# Patient Record
Sex: FEMALE | Race: WHITE | NOT HISPANIC OR LATINO | Employment: OTHER | URBAN - METROPOLITAN AREA
[De-identification: names, ages, dates, MRNs, and addresses within clinical notes are randomized per-mention and may not be internally consistent; named-entity substitution may affect disease eponyms.]

---

## 2018-01-01 ENCOUNTER — OFFICE VISIT (OUTPATIENT)
Dept: PAIN MEDICINE | Facility: CLINIC | Age: 73
End: 2018-01-01

## 2018-01-01 ENCOUNTER — DOCUMENTATION (OUTPATIENT)
Dept: PAIN MEDICINE | Facility: CLINIC | Age: 73
End: 2018-01-01

## 2018-01-01 ENCOUNTER — APPOINTMENT (OUTPATIENT)
Dept: GENERAL RADIOLOGY | Facility: HOSPITAL | Age: 73
End: 2018-01-01

## 2018-01-01 ENCOUNTER — APPOINTMENT (OUTPATIENT)
Dept: MRI IMAGING | Facility: HOSPITAL | Age: 73
End: 2018-01-01

## 2018-01-01 ENCOUNTER — OUTSIDE FACILITY SERVICE (OUTPATIENT)
Dept: PAIN MEDICINE | Facility: CLINIC | Age: 73
End: 2018-01-01

## 2018-01-01 ENCOUNTER — TELEPHONE (OUTPATIENT)
Dept: NEUROSURGERY | Facility: CLINIC | Age: 73
End: 2018-01-01

## 2018-01-01 ENCOUNTER — HOSPITAL ENCOUNTER (INPATIENT)
Facility: HOSPITAL | Age: 73
LOS: 3 days | Discharge: NURSING, REHAB OR LONG-TERM CARE | End: 2018-12-21
Attending: EMERGENCY MEDICINE | Admitting: HOSPITALIST

## 2018-01-01 ENCOUNTER — OFFICE VISIT (OUTPATIENT)
Dept: NEUROSURGERY | Facility: CLINIC | Age: 73
End: 2018-01-01

## 2018-01-01 ENCOUNTER — HOSPITAL ENCOUNTER (OUTPATIENT)
Dept: MRI IMAGING | Facility: HOSPITAL | Age: 73
Discharge: HOME | End: 2018-08-23
Admitting: NURSE PRACTITIONER

## 2018-01-01 ENCOUNTER — DOCUMENTATION (OUTPATIENT)
Dept: SOCIAL WORK | Facility: HOSPITAL | Age: 73
End: 2018-01-01

## 2018-01-01 ENCOUNTER — APPOINTMENT (OUTPATIENT)
Dept: MRI IMAGING | Facility: HOSPITAL | Age: 73
End: 2018-01-01
Attending: HOSPITALIST

## 2018-01-01 ENCOUNTER — RESULTS ENCOUNTER (OUTPATIENT)
Dept: PAIN MEDICINE | Facility: CLINIC | Age: 73
End: 2018-01-01

## 2018-01-01 VITALS
SYSTOLIC BLOOD PRESSURE: 134 MMHG | DIASTOLIC BLOOD PRESSURE: 74 MMHG | RESPIRATION RATE: 16 BRPM | HEART RATE: 72 BPM | HEIGHT: 59 IN | OXYGEN SATURATION: 94 % | TEMPERATURE: 97.8 F

## 2018-01-01 VITALS
HEIGHT: 59 IN | BODY MASS INDEX: 36.29 KG/M2 | SYSTOLIC BLOOD PRESSURE: 110 MMHG | WEIGHT: 180 LBS | HEART RATE: 68 BPM | DIASTOLIC BLOOD PRESSURE: 74 MMHG

## 2018-01-01 VITALS
HEIGHT: 60 IN | OXYGEN SATURATION: 92 % | DIASTOLIC BLOOD PRESSURE: 75 MMHG | HEART RATE: 90 BPM | TEMPERATURE: 98.5 F | SYSTOLIC BLOOD PRESSURE: 125 MMHG | BODY MASS INDEX: 38.99 KG/M2 | RESPIRATION RATE: 18 BRPM | WEIGHT: 198.6 LBS

## 2018-01-01 VITALS — BODY MASS INDEX: 38.87 KG/M2 | WEIGHT: 198 LBS | HEIGHT: 60 IN

## 2018-01-01 VITALS
WEIGHT: 185 LBS | TEMPERATURE: 98.2 F | DIASTOLIC BLOOD PRESSURE: 61 MMHG | BODY MASS INDEX: 37.29 KG/M2 | RESPIRATION RATE: 15 BRPM | HEIGHT: 59 IN | OXYGEN SATURATION: 94 % | HEART RATE: 71 BPM | SYSTOLIC BLOOD PRESSURE: 103 MMHG

## 2018-01-01 VITALS — HEIGHT: 59 IN | HEART RATE: 82 BPM | SYSTOLIC BLOOD PRESSURE: 121 MMHG | DIASTOLIC BLOOD PRESSURE: 66 MMHG

## 2018-01-01 DIAGNOSIS — R53.1 GENERALIZED WEAKNESS: ICD-10-CM

## 2018-01-01 DIAGNOSIS — M51.16 HERNIATION OF LUMBAR INTERVERTEBRAL DISC WITH RADICULOPATHY: ICD-10-CM

## 2018-01-01 DIAGNOSIS — I87.8 VENOUS STASIS: ICD-10-CM

## 2018-01-01 DIAGNOSIS — E87.6 HYPOKALEMIA: Primary | ICD-10-CM

## 2018-01-01 DIAGNOSIS — M54.50 LUMBOSACRAL PAIN: ICD-10-CM

## 2018-01-01 DIAGNOSIS — M48.062 SPINAL STENOSIS OF LUMBAR REGION WITH NEUROGENIC CLAUDICATION: Primary | ICD-10-CM

## 2018-01-01 DIAGNOSIS — S30.0XXA CONTUSION OF LOWER BACK, INITIAL ENCOUNTER: ICD-10-CM

## 2018-01-01 DIAGNOSIS — Z74.09 IMPAIRED FUNCTIONAL MOBILITY, BALANCE, GAIT, AND ENDURANCE: ICD-10-CM

## 2018-01-01 DIAGNOSIS — G89.29 OTHER CHRONIC PAIN: Primary | ICD-10-CM

## 2018-01-01 DIAGNOSIS — M54.16 LUMBAR RADICULOPATHY: ICD-10-CM

## 2018-01-01 DIAGNOSIS — Z91.81 AT HIGH RISK FOR INJURY RELATED TO FALL: ICD-10-CM

## 2018-01-01 DIAGNOSIS — M53.3 SACROILIAC JOINT DYSFUNCTION OF RIGHT SIDE: ICD-10-CM

## 2018-01-01 DIAGNOSIS — R79.1 ELEVATED INR: ICD-10-CM

## 2018-01-01 DIAGNOSIS — G89.29 OTHER CHRONIC PAIN: ICD-10-CM

## 2018-01-01 DIAGNOSIS — S80.12XA LEG HEMATOMA, LEFT, INITIAL ENCOUNTER: ICD-10-CM
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
## 2018-01-01 DIAGNOSIS — S70.01XA CONTUSION OF RIGHT HIP, INITIAL ENCOUNTER: ICD-10-CM

## 2018-01-01 DIAGNOSIS — M54.50 LUMBOSACRAL PAIN: Primary | ICD-10-CM

## 2018-01-01 DIAGNOSIS — Z79.01 ANTICOAGULATED: ICD-10-CM

## 2018-01-01 LAB
ALBUMIN SERPL-MCNC: 3 G/DL (ref 3.5–5.2)
ALBUMIN SERPL-MCNC: 3.5 G/DL (ref 3.5–5.2)
ALBUMIN/GLOB SERPL: 1.2 G/DL
ALBUMIN/GLOB SERPL: 1.2 G/DL
ALP SERPL-CCNC: 104 U/L (ref 39–117)
ALP SERPL-CCNC: 87 U/L (ref 39–117)
ALT SERPL W P-5'-P-CCNC: 19 U/L (ref 1–33)
ALT SERPL W P-5'-P-CCNC: 21 U/L (ref 1–33)
ANION GAP SERPL CALCULATED.3IONS-SCNC: 10.1 MMOL/L
ANION GAP SERPL CALCULATED.3IONS-SCNC: 11.4 MMOL/L
ANION GAP SERPL CALCULATED.3IONS-SCNC: 14.7 MMOL/L
ANION GAP SERPL CALCULATED.3IONS-SCNC: 18.2 MMOL/L
ANION GAP SERPL CALCULATED.3IONS-SCNC: 9.4 MMOL/L
AST SERPL-CCNC: 16 U/L (ref 1–32)
AST SERPL-CCNC: 21 U/L (ref 1–32)
BASOPHILS # BLD AUTO: 0.01 10*3/MM3 (ref 0–0.2)
BASOPHILS # BLD AUTO: 0.03 10*3/MM3 (ref 0–0.2)
BASOPHILS # BLD AUTO: 0.03 10*3/MM3 (ref 0–0.2)
BASOPHILS # BLD AUTO: 0.04 10*3/MM3 (ref 0–0.2)
BASOPHILS NFR BLD AUTO: 0.1 % (ref 0–1.5)
BASOPHILS NFR BLD AUTO: 0.2 % (ref 0–1.5)
BASOPHILS NFR BLD AUTO: 0.3 % (ref 0–1.5)
BASOPHILS NFR BLD AUTO: 0.4 % (ref 0–1.5)
BILIRUB SERPL-MCNC: 0.2 MG/DL (ref 0.1–1.2)
BILIRUB SERPL-MCNC: 0.2 MG/DL (ref 0.1–1.2)
BILIRUB UR QL STRIP: NEGATIVE
BUN BLD-MCNC: 22 MG/DL (ref 8–23)
BUN BLD-MCNC: 22 MG/DL (ref 8–23)
BUN BLD-MCNC: 26 MG/DL (ref 8–23)
BUN BLD-MCNC: 35 MG/DL (ref 8–23)
BUN BLD-MCNC: 41 MG/DL (ref 8–23)
BUN/CREAT SERPL: 23.7 (ref 7–25)
BUN/CREAT SERPL: 24.1 (ref 7–25)
BUN/CREAT SERPL: 26.2 (ref 7–25)
BUN/CREAT SERPL: 27.9 (ref 7–25)
BUN/CREAT SERPL: 30.2 (ref 7–25)
CALCIUM SPEC-SCNC: 8.8 MG/DL (ref 8.6–10.5)
CALCIUM SPEC-SCNC: 9 MG/DL (ref 8.6–10.5)
CALCIUM SPEC-SCNC: 9.2 MG/DL (ref 8.6–10.5)
CALCIUM SPEC-SCNC: 9.4 MG/DL (ref 8.6–10.5)
CALCIUM SPEC-SCNC: 9.4 MG/DL (ref 8.6–10.5)
CHLORIDE SERPL-SCNC: 102 MMOL/L (ref 98–107)
CHLORIDE SERPL-SCNC: 103 MMOL/L (ref 98–107)
CHLORIDE SERPL-SCNC: 104 MMOL/L (ref 98–107)
CHLORIDE SERPL-SCNC: 90 MMOL/L (ref 98–107)
CHLORIDE SERPL-SCNC: 94 MMOL/L (ref 98–107)
CK SERPL-CCNC: 44 U/L (ref 20–180)
CLARITY UR: CLEAR
CO2 SERPL-SCNC: 22.6 MMOL/L (ref 22–29)
CO2 SERPL-SCNC: 23.6 MMOL/L (ref 22–29)
CO2 SERPL-SCNC: 23.9 MMOL/L (ref 22–29)
CO2 SERPL-SCNC: 26.8 MMOL/L (ref 22–29)
CO2 SERPL-SCNC: 28.3 MMOL/L (ref 22–29)
COLOR UR: YELLOW
CREAT BLD-MCNC: 0.84 MG/DL (ref 0.57–1)
CREAT BLD-MCNC: 0.93 MG/DL (ref 0.57–1)
CREAT BLD-MCNC: 1.08 MG/DL (ref 0.57–1)
CREAT BLD-MCNC: 1.16 MG/DL (ref 0.57–1)
CREAT BLD-MCNC: 1.47 MG/DL (ref 0.57–1)
DEPRECATED RDW RBC AUTO: 53.6 FL (ref 37–54)
DEPRECATED RDW RBC AUTO: 54.3 FL (ref 37–54)
DEPRECATED RDW RBC AUTO: 56.2 FL (ref 37–54)
DEPRECATED RDW RBC AUTO: 56.2 FL (ref 37–54)
EOSINOPHIL # BLD AUTO: 0 10*3/MM3 (ref 0–0.7)
EOSINOPHIL # BLD AUTO: 0.06 10*3/MM3 (ref 0–0.7)
EOSINOPHIL # BLD AUTO: 0.08 10*3/MM3 (ref 0–0.7)
EOSINOPHIL # BLD AUTO: 0.14 10*3/MM3 (ref 0–0.7)
EOSINOPHIL NFR BLD AUTO: 0 % (ref 0.3–6.2)
EOSINOPHIL NFR BLD AUTO: 0.5 % (ref 0.3–6.2)
EOSINOPHIL NFR BLD AUTO: 0.7 % (ref 0.3–6.2)
EOSINOPHIL NFR BLD AUTO: 1.3 % (ref 0.3–6.2)
ERYTHROCYTE [DISTWIDTH] IN BLOOD BY AUTOMATED COUNT: 16 % (ref 11.7–13)
ERYTHROCYTE [DISTWIDTH] IN BLOOD BY AUTOMATED COUNT: 16.1 % (ref 11.7–13)
ERYTHROCYTE [DISTWIDTH] IN BLOOD BY AUTOMATED COUNT: 16.4 % (ref 11.7–13)
ERYTHROCYTE [DISTWIDTH] IN BLOOD BY AUTOMATED COUNT: 16.8 % (ref 11.7–13)
FOLATE SERPL-MCNC: 5.56 NG/ML (ref 4.78–24.2)
GFR SERPL CREATININE-BSD FRML MDRD: 35 ML/MIN/1.73
GFR SERPL CREATININE-BSD FRML MDRD: 46 ML/MIN/1.73
GFR SERPL CREATININE-BSD FRML MDRD: 50 ML/MIN/1.73
GFR SERPL CREATININE-BSD FRML MDRD: 59 ML/MIN/1.73
GFR SERPL CREATININE-BSD FRML MDRD: 67 ML/MIN/1.73
GLOBULIN UR ELPH-MCNC: 2.5 GM/DL
GLOBULIN UR ELPH-MCNC: 2.9 GM/DL
GLUCOSE BLD-MCNC: 106 MG/DL (ref 65–99)
GLUCOSE BLD-MCNC: 120 MG/DL (ref 65–99)
GLUCOSE BLD-MCNC: 130 MG/DL (ref 65–99)
GLUCOSE BLD-MCNC: 144 MG/DL (ref 65–99)
GLUCOSE BLD-MCNC: 199 MG/DL (ref 65–99)
GLUCOSE UR STRIP-MCNC: NEGATIVE MG/DL
HCT VFR BLD AUTO: 23.9 % (ref 35.6–45.5)
HCT VFR BLD AUTO: 28 % (ref 35.6–45.5)
HCT VFR BLD AUTO: 28.7 % (ref 35.6–45.5)
HCT VFR BLD AUTO: 29.8 % (ref 35.6–45.5)
HCT VFR BLD AUTO: 30 % (ref 35.6–45.5)
HGB BLD-MCNC: 7.6 G/DL (ref 11.9–15.5)
HGB BLD-MCNC: 8.5 G/DL (ref 11.9–15.5)
HGB BLD-MCNC: 8.8 G/DL (ref 11.9–15.5)
HGB BLD-MCNC: 9.3 G/DL (ref 11.9–15.5)
HGB BLD-MCNC: 9.4 G/DL (ref 11.9–15.5)
HGB UR QL STRIP.AUTO: NEGATIVE
IMM GRANULOCYTES # BLD AUTO: 0.3 10*3/MM3 (ref 0–0.03)
IMM GRANULOCYTES # BLD: 0.17 10*3/MM3 (ref 0–0.03)
IMM GRANULOCYTES # BLD: 0.2 10*3/MM3 (ref 0–0.03)
IMM GRANULOCYTES # BLD: 0.35 10*3/MM3 (ref 0–0.03)
IMM GRANULOCYTES NFR BLD AUTO: 2.6 % (ref 0–0.5)
IMM GRANULOCYTES NFR BLD: 1.6 % (ref 0–0.5)
IMM GRANULOCYTES NFR BLD: 1.6 % (ref 0–0.5)
IMM GRANULOCYTES NFR BLD: 3.4 % (ref 0–0.5)
INR PPP: 2.2 (ref 0.9–1.1)
INR PPP: 3.22 (ref 0.9–1.1)
INR PPP: 3.82 (ref 0.9–1.1)
INR PPP: 5.3 (ref 0.9–1.1)
INR PPP: 5.6 (ref 0.9–1.1)
IRON 24H UR-MRATE: 53 MCG/DL (ref 37–145)
IRON SATN MFR SERPL: 16 % (ref 20–50)
KETONES UR QL STRIP: NEGATIVE
LEUKOCYTE ESTERASE UR QL STRIP.AUTO: NEGATIVE
LYMPHOCYTES # BLD AUTO: 1.99 10*3/MM3 (ref 0.9–4.8)
LYMPHOCYTES # BLD AUTO: 2.56 10*3/MM3 (ref 0.9–4.8)
LYMPHOCYTES # BLD AUTO: 3.14 10*3/MM3 (ref 0.9–4.8)
LYMPHOCYTES # BLD AUTO: 3.29 10*3/MM3 (ref 0.9–4.8)
LYMPHOCYTES NFR BLD AUTO: 19.2 % (ref 19.6–45.3)
LYMPHOCYTES NFR BLD AUTO: 22.2 % (ref 19.6–45.3)
LYMPHOCYTES NFR BLD AUTO: 25.7 % (ref 19.6–45.3)
LYMPHOCYTES NFR BLD AUTO: 30.4 % (ref 19.6–45.3)
MAGNESIUM SERPL-MCNC: 1.8 MG/DL (ref 1.6–2.4)
MCH RBC QN AUTO: 28.2 PG (ref 26.9–32)
MCH RBC QN AUTO: 28.8 PG (ref 26.9–32)
MCH RBC QN AUTO: 28.9 PG (ref 26.9–32)
MCH RBC QN AUTO: 29.2 PG (ref 26.9–32)
MCHC RBC AUTO-ENTMCNC: 30.4 G/DL (ref 32.4–36.3)
MCHC RBC AUTO-ENTMCNC: 30.7 G/DL (ref 32.4–36.3)
MCHC RBC AUTO-ENTMCNC: 31.5 G/DL (ref 32.4–36.3)
MCHC RBC AUTO-ENTMCNC: 31.8 G/DL (ref 32.4–36.3)
MCV RBC AUTO: 91.7 FL (ref 80.5–98.2)
MCV RBC AUTO: 91.9 FL (ref 80.5–98.2)
MCV RBC AUTO: 92 FL (ref 80.5–98.2)
MCV RBC AUTO: 94.9 FL (ref 80.5–98.2)
MONOCYTES # BLD AUTO: 0.66 10*3/MM3 (ref 0.2–1.2)
MONOCYTES # BLD AUTO: 1.04 10*3/MM3 (ref 0.2–1.2)
MONOCYTES # BLD AUTO: 1.27 10*3/MM3 (ref 0.2–1.2)
MONOCYTES # BLD AUTO: 1.31 10*3/MM3 (ref 0.2–1.2)
MONOCYTES NFR BLD AUTO: 10.4 % (ref 5–12)
MONOCYTES NFR BLD AUTO: 12.1 % (ref 5–12)
MONOCYTES NFR BLD AUTO: 6.4 % (ref 5–12)
MONOCYTES NFR BLD AUTO: 9 % (ref 5–12)
NEUTROPHILS # BLD AUTO: 5.89 10*3/MM3 (ref 1.9–8.1)
NEUTROPHILS # BLD AUTO: 7.38 10*3/MM3 (ref 1.9–8.1)
NEUTROPHILS # BLD AUTO: 7.52 10*3/MM3 (ref 1.9–8.1)
NEUTROPHILS # BLD AUTO: 7.84 10*3/MM3 (ref 1.9–8.1)
NEUTROPHILS NFR BLD AUTO: 54.2 % (ref 42.7–76)
NEUTROPHILS NFR BLD AUTO: 61.4 % (ref 42.7–76)
NEUTROPHILS NFR BLD AUTO: 68 % (ref 42.7–76)
NEUTROPHILS NFR BLD AUTO: 70.9 % (ref 42.7–76)
NITRITE UR QL STRIP: NEGATIVE
PH UR STRIP.AUTO: 6.5 [PH] (ref 5–8)
PLAT MORPH BLD: NORMAL
PLATELET # BLD AUTO: 297 10*3/MM3 (ref 140–500)
PLATELET # BLD AUTO: 299 10*3/MM3 (ref 140–500)
PLATELET # BLD AUTO: 308 10*3/MM3 (ref 140–500)
PLATELET # BLD AUTO: 309 10*3/MM3 (ref 140–500)
PMV BLD AUTO: 9.3 FL (ref 6–12)
PMV BLD AUTO: 9.3 FL (ref 6–12)
PMV BLD AUTO: 9.4 FL (ref 6–12)
PMV BLD AUTO: 9.4 FL (ref 6–12)
POTASSIUM BLD-SCNC: 2.5 MMOL/L (ref 3.5–5.2)
POTASSIUM BLD-SCNC: 2.5 MMOL/L (ref 3.5–5.2)
POTASSIUM BLD-SCNC: 3.6 MMOL/L (ref 3.5–5.2)
POTASSIUM BLD-SCNC: 3.8 MMOL/L (ref 3.5–5.2)
POTASSIUM BLD-SCNC: 3.9 MMOL/L (ref 3.5–5.2)
POTASSIUM BLD-SCNC: 4.2 MMOL/L (ref 3.5–5.2)
PROT SERPL-MCNC: 5.5 G/DL (ref 6–8.5)
PROT SERPL-MCNC: 6.4 G/DL (ref 6–8.5)
PROT UR QL STRIP: NEGATIVE
PROTHROMBIN TIME: 24.1 SECONDS (ref 11.7–14.2)
PROTHROMBIN TIME: 32.4 SECONDS (ref 11.7–14.2)
PROTHROMBIN TIME: 37 SECONDS (ref 11.7–14.2)
PROTHROMBIN TIME: 47.9 SECONDS (ref 11.7–14.2)
PROTHROMBIN TIME: 49.9 SECONDS (ref 11.7–14.2)
RBC # BLD AUTO: 2.6 10*6/MM3 (ref 3.9–5.2)
RBC # BLD AUTO: 2.95 10*6/MM3 (ref 3.9–5.2)
RBC # BLD AUTO: 3.12 10*6/MM3 (ref 3.9–5.2)
RBC # BLD AUTO: 3.25 10*6/MM3 (ref 3.9–5.2)
RBC MORPH BLD: NORMAL
SODIUM BLD-SCNC: 135 MMOL/L (ref 136–145)
SODIUM BLD-SCNC: 136 MMOL/L (ref 136–145)
SODIUM BLD-SCNC: 136 MMOL/L (ref 136–145)
SODIUM BLD-SCNC: 137 MMOL/L (ref 136–145)
SODIUM BLD-SCNC: 138 MMOL/L (ref 136–145)
SP GR UR STRIP: 1.01 (ref 1–1.03)
TIBC SERPL-MCNC: 334 MCG/DL
TRANSFERRIN SERPL-MCNC: 224 MG/DL (ref 200–360)
TROPONIN T SERPL-MCNC: <0.01 NG/ML (ref 0–0.03)
TSH SERPL DL<=0.05 MIU/L-ACNC: 0.04 MIU/ML (ref 0.27–4.2)
UROBILINOGEN UR QL STRIP: NORMAL
VIT B12 BLD-MCNC: 374 PG/ML (ref 211–946)
WBC MORPH BLD: NORMAL
WBC NRBC COR # BLD: 10.39 10*3/MM3 (ref 4.5–10.7)
WBC NRBC COR # BLD: 10.84 10*3/MM3 (ref 4.5–10.7)
WBC NRBC COR # BLD: 11.53 10*3/MM3 (ref 4.5–10.7)
WBC NRBC COR # BLD: 12.24 10*3/MM3 (ref 4.5–10.7)

## 2018-01-01 PROCEDURE — 64483 NJX AA&/STRD TFRM EPI L/S 1: CPT | Performed by: ANESTHESIOLOGY

## 2018-01-01 PROCEDURE — 72110 X-RAY EXAM L-2 SPINE 4/>VWS: CPT

## 2018-01-01 PROCEDURE — 99285 EMERGENCY DEPT VISIT HI MDM: CPT

## 2018-01-01 PROCEDURE — 81003 URINALYSIS AUTO W/O SCOPE: CPT | Performed by: PHYSICIAN ASSISTANT

## 2018-01-01 PROCEDURE — 97110 THERAPEUTIC EXERCISES: CPT

## 2018-01-01 PROCEDURE — 97535 SELF CARE MNGMENT TRAINING: CPT

## 2018-01-01 PROCEDURE — 25010000002 METHYLPREDNISOLONE PER 125 MG: Performed by: HOSPITALIST

## 2018-01-01 PROCEDURE — 84132 ASSAY OF SERUM POTASSIUM: CPT | Performed by: HOSPITALIST

## 2018-01-01 PROCEDURE — 85610 PROTHROMBIN TIME: CPT | Performed by: INTERNAL MEDICINE

## 2018-01-01 PROCEDURE — 84466 ASSAY OF TRANSFERRIN: CPT | Performed by: HOSPITALIST

## 2018-01-01 PROCEDURE — 93005 ELECTROCARDIOGRAM TRACING: CPT | Performed by: HOSPITALIST

## 2018-01-01 PROCEDURE — 93010 ELECTROCARDIOGRAM REPORT: CPT | Performed by: INTERNAL MEDICINE

## 2018-01-01 PROCEDURE — 85014 HEMATOCRIT: CPT | Performed by: HOSPITALIST

## 2018-01-01 PROCEDURE — 97166 OT EVAL MOD COMPLEX 45 MIN: CPT

## 2018-01-01 PROCEDURE — 25010000003 POTASSIUM CHLORIDE 10 MEQ/100ML SOLUTION: Performed by: PHYSICIAN ASSISTANT

## 2018-01-01 PROCEDURE — 0 GADOBENATE DIMEGLUMINE 529 MG/ML SOLUTION: Performed by: HOSPITALIST

## 2018-01-01 PROCEDURE — 85025 COMPLETE CBC W/AUTO DIFF WBC: CPT | Performed by: HOSPITALIST

## 2018-01-01 PROCEDURE — 63710000001 PREDNISONE PER 1 MG: Performed by: HOSPITALIST

## 2018-01-01 PROCEDURE — 83735 ASSAY OF MAGNESIUM: CPT | Performed by: HOSPITALIST

## 2018-01-01 PROCEDURE — 97530 THERAPEUTIC ACTIVITIES: CPT

## 2018-01-01 PROCEDURE — 99214 OFFICE O/P EST MOD 30 MIN: CPT | Performed by: NURSE PRACTITIONER

## 2018-01-01 PROCEDURE — 99214 OFFICE O/P EST MOD 30 MIN: CPT | Performed by: NEUROLOGICAL SURGERY

## 2018-01-01 PROCEDURE — 70551 MRI BRAIN STEM W/O DYE: CPT

## 2018-01-01 PROCEDURE — 85610 PROTHROMBIN TIME: CPT | Performed by: PHYSICIAN ASSISTANT

## 2018-01-01 PROCEDURE — 84443 ASSAY THYROID STIM HORMONE: CPT | Performed by: HOSPITALIST

## 2018-01-01 PROCEDURE — 25010000002 HYDROMORPHONE PER 4 MG: Performed by: HOSPITALIST

## 2018-01-01 PROCEDURE — 82746 ASSAY OF FOLIC ACID SERUM: CPT | Performed by: HOSPITALIST

## 2018-01-01 PROCEDURE — 80048 BASIC METABOLIC PNL TOTAL CA: CPT | Performed by: HOSPITALIST

## 2018-01-01 PROCEDURE — 93005 ELECTROCARDIOGRAM TRACING: CPT | Performed by: PHYSICIAN ASSISTANT

## 2018-01-01 PROCEDURE — 85018 HEMOGLOBIN: CPT | Performed by: HOSPITALIST

## 2018-01-01 PROCEDURE — 73552 X-RAY EXAM OF FEMUR 2/>: CPT

## 2018-01-01 PROCEDURE — 85025 COMPLETE CBC W/AUTO DIFF WBC: CPT | Performed by: PHYSICIAN ASSISTANT

## 2018-01-01 PROCEDURE — 90791 PSYCH DIAGNOSTIC EVALUATION: CPT | Performed by: MARRIAGE & FAMILY THERAPIST

## 2018-01-01 PROCEDURE — 80053 COMPREHEN METABOLIC PANEL: CPT | Performed by: PHYSICIAN ASSISTANT

## 2018-01-01 PROCEDURE — A9577 INJ MULTIHANCE: HCPCS | Performed by: HOSPITALIST

## 2018-01-01 PROCEDURE — 27096 INJECT SACROILIAC JOINT: CPT | Performed by: ANESTHESIOLOGY

## 2018-01-01 PROCEDURE — 97162 PT EVAL MOD COMPLEX 30 MIN: CPT

## 2018-01-01 PROCEDURE — 85007 BL SMEAR W/DIFF WBC COUNT: CPT | Performed by: HOSPITALIST

## 2018-01-01 PROCEDURE — 85025 COMPLETE CBC W/AUTO DIFF WBC: CPT | Performed by: INTERNAL MEDICINE

## 2018-01-01 PROCEDURE — 80053 COMPREHEN METABOLIC PANEL: CPT | Performed by: HOSPITALIST

## 2018-01-01 PROCEDURE — 84484 ASSAY OF TROPONIN QUANT: CPT | Performed by: HOSPITALIST

## 2018-01-01 PROCEDURE — 82607 VITAMIN B-12: CPT | Performed by: HOSPITALIST

## 2018-01-01 PROCEDURE — 99215 OFFICE O/P EST HI 40 MIN: CPT | Performed by: NURSE PRACTITIONER

## 2018-01-01 PROCEDURE — 73502 X-RAY EXAM HIP UNI 2-3 VIEWS: CPT

## 2018-01-01 PROCEDURE — 83540 ASSAY OF IRON: CPT | Performed by: HOSPITALIST

## 2018-01-01 PROCEDURE — 99203 OFFICE O/P NEW LOW 30 MIN: CPT | Performed by: NURSE PRACTITIONER

## 2018-01-01 PROCEDURE — 25810000003 SODIUM CHLORIDE 0.9 % WITH KCL 20 MEQ 20-0.9 MEQ/L-% SOLUTION: Performed by: HOSPITALIST

## 2018-01-01 PROCEDURE — 82550 ASSAY OF CK (CPK): CPT | Performed by: HOSPITALIST

## 2018-01-01 PROCEDURE — 72158 MRI LUMBAR SPINE W/O & W/DYE: CPT

## 2018-01-01 PROCEDURE — 72148 MRI LUMBAR SPINE W/O DYE: CPT

## 2018-01-01 RX ORDER — DULOXETIN HYDROCHLORIDE 30 MG/1
30 CAPSULE, DELAYED RELEASE ORAL 2 TIMES DAILY
Start: 2018-01-01 | End: 2019-01-01 | Stop reason: SDUPTHER

## 2018-01-01 RX ORDER — MAGNESIUM SULFATE HEPTAHYDRATE 40 MG/ML
2 INJECTION, SOLUTION INTRAVENOUS AS NEEDED
Status: DISCONTINUED | OUTPATIENT
Start: 2018-01-01 | End: 2018-01-01 | Stop reason: HOSPADM

## 2018-01-01 RX ORDER — POTASSIUM CHLORIDE 7.45 MG/ML
10 INJECTION INTRAVENOUS ONCE
Status: COMPLETED | OUTPATIENT
Start: 2018-01-01 | End: 2018-01-01

## 2018-01-01 RX ORDER — SODIUM CHLORIDE 9 MG/ML
125 INJECTION, SOLUTION INTRAVENOUS CONTINUOUS
Status: DISCONTINUED | OUTPATIENT
Start: 2018-01-01 | End: 2018-01-01

## 2018-01-01 RX ORDER — ONDANSETRON 4 MG/1
4 TABLET, FILM COATED ORAL EVERY 6 HOURS PRN
Status: DISCONTINUED | OUTPATIENT
Start: 2018-01-01 | End: 2018-01-01 | Stop reason: HOSPADM

## 2018-01-01 RX ORDER — ONDANSETRON 2 MG/ML
4 INJECTION INTRAMUSCULAR; INTRAVENOUS EVERY 6 HOURS PRN
Status: DISCONTINUED | OUTPATIENT
Start: 2018-01-01 | End: 2018-01-01 | Stop reason: SDUPTHER

## 2018-01-01 RX ORDER — HYDROCODONE BITARTRATE AND ACETAMINOPHEN 7.5; 325 MG/1; MG/1
1 TABLET ORAL EVERY 4 HOURS PRN
Status: DISCONTINUED | OUTPATIENT
Start: 2018-01-01 | End: 2018-01-01 | Stop reason: HOSPADM

## 2018-01-01 RX ORDER — SODIUM CHLORIDE 0.9 % (FLUSH) 0.9 %
3 SYRINGE (ML) INJECTION EVERY 12 HOURS SCHEDULED
Status: DISCONTINUED | OUTPATIENT
Start: 2018-01-01 | End: 2018-01-01 | Stop reason: HOSPADM

## 2018-01-01 RX ORDER — FAMOTIDINE 20 MG/1
20 TABLET, FILM COATED ORAL ONCE
Status: COMPLETED | OUTPATIENT
Start: 2018-01-01 | End: 2018-01-01

## 2018-01-01 RX ORDER — WARFARIN SODIUM 2 MG/1
TABLET ORAL
Start: 2018-01-01 | End: 2019-01-01

## 2018-01-01 RX ORDER — PREDNISONE 20 MG/1
20 TABLET ORAL DAILY
Status: DISCONTINUED | OUTPATIENT
Start: 2018-01-01 | End: 2018-01-01 | Stop reason: HOSPADM

## 2018-01-01 RX ORDER — ATORVASTATIN CALCIUM 20 MG/1
20 TABLET, FILM COATED ORAL NIGHTLY
Status: DISCONTINUED | OUTPATIENT
Start: 2018-01-01 | End: 2018-01-01 | Stop reason: HOSPADM

## 2018-01-01 RX ORDER — DULOXETIN HYDROCHLORIDE 30 MG/1
30 CAPSULE, DELAYED RELEASE ORAL 2 TIMES DAILY
Status: DISCONTINUED | OUTPATIENT
Start: 2018-01-01 | End: 2018-01-01 | Stop reason: HOSPADM

## 2018-01-01 RX ORDER — ACETAMINOPHEN 325 MG/1
650 TABLET ORAL EVERY 4 HOURS PRN
Status: DISCONTINUED | OUTPATIENT
Start: 2018-01-01 | End: 2018-01-01 | Stop reason: HOSPADM

## 2018-01-01 RX ORDER — POTASSIUM CHLORIDE 750 MG/1
40 CAPSULE, EXTENDED RELEASE ORAL ONCE
Status: COMPLETED | OUTPATIENT
Start: 2018-01-01 | End: 2018-01-01

## 2018-01-01 RX ORDER — POTASSIUM CHLORIDE 1.5 G/1.77G
40 POWDER, FOR SOLUTION ORAL AS NEEDED
Status: DISCONTINUED | OUTPATIENT
Start: 2018-01-01 | End: 2018-01-01 | Stop reason: HOSPADM

## 2018-01-01 RX ORDER — MAGNESIUM SULFATE HEPTAHYDRATE 40 MG/ML
4 INJECTION, SOLUTION INTRAVENOUS AS NEEDED
Status: DISCONTINUED | OUTPATIENT
Start: 2018-01-01 | End: 2018-01-01 | Stop reason: HOSPADM

## 2018-01-01 RX ORDER — NALOXONE HCL 0.4 MG/ML
0.4 VIAL (ML) INJECTION
Status: DISCONTINUED | OUTPATIENT
Start: 2018-01-01 | End: 2018-01-01

## 2018-01-01 RX ORDER — SODIUM CHLORIDE 0.9 % (FLUSH) 0.9 %
3-10 SYRINGE (ML) INJECTION AS NEEDED
Status: DISCONTINUED | OUTPATIENT
Start: 2018-01-01 | End: 2018-01-01 | Stop reason: HOSPADM

## 2018-01-01 RX ORDER — ALPRAZOLAM 0.5 MG/1
0.5 TABLET ORAL 2 TIMES DAILY PRN
COMMUNITY
End: 2019-01-01 | Stop reason: SDUPTHER

## 2018-01-01 RX ORDER — HYDROMORPHONE HYDROCHLORIDE 1 MG/ML
0.5 INJECTION, SOLUTION INTRAMUSCULAR; INTRAVENOUS; SUBCUTANEOUS
Status: DISCONTINUED | OUTPATIENT
Start: 2018-01-01 | End: 2018-01-01

## 2018-01-01 RX ORDER — PREDNISONE 20 MG/1
20 TABLET ORAL DAILY
Start: 2018-01-01 | End: 2019-01-01 | Stop reason: DRUGHIGH

## 2018-01-01 RX ORDER — ISOSORBIDE MONONITRATE 30 MG/1
30 TABLET, EXTENDED RELEASE ORAL DAILY
Status: DISCONTINUED | OUTPATIENT
Start: 2018-01-01 | End: 2018-01-01 | Stop reason: HOSPADM

## 2018-01-01 RX ORDER — ISOSORBIDE MONONITRATE 30 MG/1
30 TABLET, EXTENDED RELEASE ORAL DAILY
COMMUNITY
End: 2019-01-01 | Stop reason: SDUPTHER

## 2018-01-01 RX ORDER — HYDROCODONE BITARTRATE AND ACETAMINOPHEN 7.5; 325 MG/1; MG/1
1 TABLET ORAL EVERY 4 HOURS PRN
Qty: 21 TABLET | Refills: 0 | Status: SHIPPED | OUTPATIENT
Start: 2018-01-01 | End: 2018-01-01

## 2018-01-01 RX ORDER — SENNA AND DOCUSATE SODIUM 50; 8.6 MG/1; MG/1
2 TABLET, FILM COATED ORAL 2 TIMES DAILY PRN
Status: DISCONTINUED | OUTPATIENT
Start: 2018-01-01 | End: 2018-01-01 | Stop reason: HOSPADM

## 2018-01-01 RX ORDER — OXYCODONE HYDROCHLORIDE AND ACETAMINOPHEN 5; 325 MG/1; MG/1
1 TABLET ORAL ONCE
Status: COMPLETED | OUTPATIENT
Start: 2018-01-01 | End: 2018-01-01

## 2018-01-01 RX ORDER — ACETAMINOPHEN 325 MG/1
650 TABLET ORAL 4 TIMES DAILY
Start: 2018-01-01 | End: 2019-01-01

## 2018-01-01 RX ORDER — ONDANSETRON 4 MG/1
4 TABLET, ORALLY DISINTEGRATING ORAL ONCE
Status: COMPLETED | OUTPATIENT
Start: 2018-01-01 | End: 2018-01-01

## 2018-01-01 RX ORDER — ALPRAZOLAM 0.5 MG/1
0.5 TABLET ORAL 2 TIMES DAILY PRN
Status: DISCONTINUED | OUTPATIENT
Start: 2018-01-01 | End: 2018-01-01 | Stop reason: HOSPADM

## 2018-01-01 RX ORDER — SODIUM CHLORIDE 0.9 % (FLUSH) 0.9 %
10 SYRINGE (ML) INJECTION AS NEEDED
Status: DISCONTINUED | OUTPATIENT
Start: 2018-01-01 | End: 2018-01-01 | Stop reason: HOSPADM

## 2018-01-01 RX ORDER — SODIUM CHLORIDE AND POTASSIUM CHLORIDE 150; 900 MG/100ML; MG/100ML
100 INJECTION, SOLUTION INTRAVENOUS CONTINUOUS
Status: DISCONTINUED | OUTPATIENT
Start: 2018-01-01 | End: 2018-01-01

## 2018-01-01 RX ORDER — CARVEDILOL 12.5 MG/1
12.5 TABLET ORAL EVERY 12 HOURS SCHEDULED
Start: 2018-01-01 | End: 2019-01-01 | Stop reason: SDUPTHER

## 2018-01-01 RX ORDER — FUROSEMIDE 40 MG/1
40 TABLET ORAL DAILY
Start: 2018-01-01 | End: 2019-01-01 | Stop reason: SDUPTHER

## 2018-01-01 RX ORDER — ONDANSETRON 4 MG/1
4 TABLET, ORALLY DISINTEGRATING ORAL EVERY 6 HOURS PRN
Status: DISCONTINUED | OUTPATIENT
Start: 2018-01-01 | End: 2018-01-01 | Stop reason: SDUPTHER

## 2018-01-01 RX ORDER — PREDNISONE 10 MG/1
10 TABLET ORAL DAILY
COMMUNITY
End: 2018-01-01 | Stop reason: HOSPADM

## 2018-01-01 RX ORDER — MULTIPLE VITAMINS W/ MINERALS TAB 9MG-400MCG
1 TAB ORAL DAILY
Status: DISCONTINUED | OUTPATIENT
Start: 2018-01-01 | End: 2018-01-01 | Stop reason: HOSPADM

## 2018-01-01 RX ORDER — FAMOTIDINE 20 MG/1
20 TABLET, FILM COATED ORAL 2 TIMES DAILY
Status: DISCONTINUED | OUTPATIENT
Start: 2018-01-01 | End: 2018-01-01

## 2018-01-01 RX ORDER — AMLODIPINE BESYLATE 10 MG/1
10 TABLET ORAL DAILY
Status: DISCONTINUED | OUTPATIENT
Start: 2018-01-01 | End: 2018-01-01 | Stop reason: HOSPADM

## 2018-01-01 RX ORDER — ASPIRIN 81 MG/1
81 TABLET, CHEWABLE ORAL DAILY
Status: DISCONTINUED | OUTPATIENT
Start: 2018-01-01 | End: 2018-01-01 | Stop reason: HOSPADM

## 2018-01-01 RX ORDER — METHYLPREDNISOLONE SODIUM SUCCINATE 125 MG/2ML
60 INJECTION, POWDER, LYOPHILIZED, FOR SOLUTION INTRAMUSCULAR; INTRAVENOUS ONCE
Status: COMPLETED | OUTPATIENT
Start: 2018-01-01 | End: 2018-01-01

## 2018-01-01 RX ORDER — ALUMINA, MAGNESIA, AND SIMETHICONE 2400; 2400; 240 MG/30ML; MG/30ML; MG/30ML
15 SUSPENSION ORAL EVERY 6 HOURS PRN
Status: DISCONTINUED | OUTPATIENT
Start: 2018-01-01 | End: 2018-01-01 | Stop reason: HOSPADM

## 2018-01-01 RX ORDER — HYDROCODONE BITARTRATE AND ACETAMINOPHEN 5; 325 MG/1; MG/1
1 TABLET ORAL EVERY 4 HOURS PRN
Status: DISCONTINUED | OUTPATIENT
Start: 2018-01-01 | End: 2018-01-01

## 2018-01-01 RX ORDER — WARFARIN SODIUM 2 MG/1
2 TABLET ORAL
Status: DISCONTINUED | OUTPATIENT
Start: 2018-01-01 | End: 2018-01-01 | Stop reason: HOSPADM

## 2018-01-01 RX ORDER — DOXYCYCLINE HYCLATE 100 MG/1
100 CAPSULE ORAL 2 TIMES DAILY
COMMUNITY
End: 2018-01-01 | Stop reason: HOSPADM

## 2018-01-01 RX ORDER — POTASSIUM CHLORIDE 20 MEQ/1
20 TABLET, EXTENDED RELEASE ORAL DAILY
Start: 2018-01-01 | End: 2019-01-01 | Stop reason: SDUPTHER

## 2018-01-01 RX ORDER — LEVOTHYROXINE SODIUM 0.12 MG/1
125 TABLET ORAL DAILY
Status: DISCONTINUED | OUTPATIENT
Start: 2018-01-01 | End: 2018-01-01 | Stop reason: HOSPADM

## 2018-01-01 RX ORDER — PANTOPRAZOLE SODIUM 40 MG/1
40 TABLET, DELAYED RELEASE ORAL EVERY MORNING
Status: DISCONTINUED | OUTPATIENT
Start: 2018-01-01 | End: 2018-01-01 | Stop reason: HOSPADM

## 2018-01-01 RX ORDER — ONDANSETRON 4 MG/1
4 TABLET, ORALLY DISINTEGRATING ORAL EVERY 6 HOURS PRN
Status: DISCONTINUED | OUTPATIENT
Start: 2018-01-01 | End: 2018-01-01 | Stop reason: HOSPADM

## 2018-01-01 RX ORDER — ONDANSETRON 4 MG/1
4 TABLET, FILM COATED ORAL EVERY 6 HOURS PRN
Status: DISCONTINUED | OUTPATIENT
Start: 2018-01-01 | End: 2018-01-01 | Stop reason: SDUPTHER

## 2018-01-01 RX ORDER — ONDANSETRON 2 MG/ML
4 INJECTION INTRAMUSCULAR; INTRAVENOUS EVERY 6 HOURS PRN
Status: DISCONTINUED | OUTPATIENT
Start: 2018-01-01 | End: 2018-01-01 | Stop reason: HOSPADM

## 2018-01-01 RX ORDER — PREDNISONE 10 MG/1
10 TABLET ORAL DAILY
Status: DISCONTINUED | OUTPATIENT
Start: 2018-01-01 | End: 2018-01-01

## 2018-01-01 RX ORDER — POTASSIUM CHLORIDE 750 MG/1
40 CAPSULE, EXTENDED RELEASE ORAL AS NEEDED
Status: DISCONTINUED | OUTPATIENT
Start: 2018-01-01 | End: 2018-01-01 | Stop reason: HOSPADM

## 2018-01-01 RX ORDER — HYDROMORPHONE HYDROCHLORIDE 1 MG/ML
0.5 INJECTION, SOLUTION INTRAMUSCULAR; INTRAVENOUS; SUBCUTANEOUS
Status: DISPENSED | OUTPATIENT
Start: 2018-01-01 | End: 2018-01-01

## 2018-01-01 RX ORDER — IBUPROFEN 600 MG/1
600 TABLET ORAL EVERY 4 HOURS PRN
Status: DISCONTINUED | OUTPATIENT
Start: 2018-01-01 | End: 2018-01-01

## 2018-01-01 RX ORDER — CARVEDILOL 12.5 MG/1
12.5 TABLET ORAL EVERY 12 HOURS SCHEDULED
Status: DISCONTINUED | OUTPATIENT
Start: 2018-01-01 | End: 2018-01-01 | Stop reason: HOSPADM

## 2018-01-01 RX ORDER — TRAZODONE HYDROCHLORIDE 50 MG/1
50 TABLET ORAL NIGHTLY
Status: DISCONTINUED | OUTPATIENT
Start: 2018-01-01 | End: 2018-01-01 | Stop reason: HOSPADM

## 2018-01-01 RX ADMIN — HYDROMORPHONE HYDROCHLORIDE 0.5 MG: 1 INJECTION, SOLUTION INTRAMUSCULAR; INTRAVENOUS; SUBCUTANEOUS at 17:14

## 2018-01-01 RX ADMIN — HYDROMORPHONE HYDROCHLORIDE 0.5 MG: 1 INJECTION, SOLUTION INTRAMUSCULAR; INTRAVENOUS; SUBCUTANEOUS at 14:37

## 2018-01-01 RX ADMIN — HYDROMORPHONE HYDROCHLORIDE 0.5 MG: 1 INJECTION, SOLUTION INTRAMUSCULAR; INTRAVENOUS; SUBCUTANEOUS at 20:41

## 2018-01-01 RX ADMIN — ALPRAZOLAM 0.5 MG: 0.5 TABLET ORAL at 23:10

## 2018-01-01 RX ADMIN — AMLODIPINE BESYLATE 10 MG: 10 TABLET ORAL at 13:00

## 2018-01-01 RX ADMIN — MULTIPLE VITAMINS W/ MINERALS TAB 1 TABLET: TAB at 07:34

## 2018-01-01 RX ADMIN — CARVEDILOL 12.5 MG: 12.5 TABLET, FILM COATED ORAL at 07:34

## 2018-01-01 RX ADMIN — TRAZODONE HYDROCHLORIDE 50 MG: 50 TABLET ORAL at 20:41

## 2018-01-01 RX ADMIN — ONDANSETRON 4 MG: 4 TABLET, ORALLY DISINTEGRATING ORAL at 00:32

## 2018-01-01 RX ADMIN — CARVEDILOL 12.5 MG: 12.5 TABLET, FILM COATED ORAL at 09:28

## 2018-01-01 RX ADMIN — HYDROCODONE BITARTRATE AND ACETAMINOPHEN 1 TABLET: 5; 325 TABLET ORAL at 09:28

## 2018-01-01 RX ADMIN — Medication 81 MG: at 08:21

## 2018-01-01 RX ADMIN — POTASSIUM CHLORIDE 40 MEQ: 750 CAPSULE, EXTENDED RELEASE ORAL at 01:41

## 2018-01-01 RX ADMIN — ALPRAZOLAM 0.5 MG: 0.5 TABLET ORAL at 22:56

## 2018-01-01 RX ADMIN — AMLODIPINE BESYLATE 10 MG: 10 TABLET ORAL at 07:35

## 2018-01-01 RX ADMIN — DULOXETINE HYDROCHLORIDE 30 MG: 30 CAPSULE, DELAYED RELEASE ORAL at 21:17

## 2018-01-01 RX ADMIN — GADOBENATE DIMEGLUMINE 17 ML: 529 INJECTION, SOLUTION INTRAVENOUS at 12:22

## 2018-01-01 RX ADMIN — PANTOPRAZOLE SODIUM 40 MG: 40 TABLET, DELAYED RELEASE ORAL at 06:00

## 2018-01-01 RX ADMIN — SODIUM CHLORIDE, PRESERVATIVE FREE 3 ML: 5 INJECTION INTRAVENOUS at 09:29

## 2018-01-01 RX ADMIN — LEVOTHYROXINE SODIUM 125 MCG: 125 TABLET ORAL at 06:55

## 2018-01-01 RX ADMIN — SODIUM CHLORIDE, PRESERVATIVE FREE 3 ML: 5 INJECTION INTRAVENOUS at 21:18

## 2018-01-01 RX ADMIN — HYDROMORPHONE HYDROCHLORIDE 0.5 MG: 1 INJECTION, SOLUTION INTRAMUSCULAR; INTRAVENOUS; SUBCUTANEOUS at 06:29

## 2018-01-01 RX ADMIN — SODIUM CHLORIDE, PRESERVATIVE FREE 3 ML: 5 INJECTION INTRAVENOUS at 08:22

## 2018-01-01 RX ADMIN — PREDNISONE 20 MG: 20 TABLET ORAL at 09:28

## 2018-01-01 RX ADMIN — METHYLPREDNISOLONE SODIUM SUCCINATE 60 MG: 125 INJECTION, POWDER, FOR SOLUTION INTRAMUSCULAR; INTRAVENOUS at 11:05

## 2018-01-01 RX ADMIN — ISOSORBIDE MONONITRATE 30 MG: 30 TABLET ORAL at 07:33

## 2018-01-01 RX ADMIN — HYDROCODONE BITARTRATE AND ACETAMINOPHEN 1 TABLET: 7.5; 325 TABLET ORAL at 07:35

## 2018-01-01 RX ADMIN — ALPRAZOLAM 0.5 MG: 0.5 TABLET ORAL at 11:16

## 2018-01-01 RX ADMIN — SODIUM CHLORIDE, PRESERVATIVE FREE 3 ML: 5 INJECTION INTRAVENOUS at 21:00

## 2018-01-01 RX ADMIN — MULTIPLE VITAMINS W/ MINERALS TAB 1 TABLET: TAB at 08:21

## 2018-01-01 RX ADMIN — TRAZODONE HYDROCHLORIDE 50 MG: 50 TABLET ORAL at 21:17

## 2018-01-01 RX ADMIN — HYDROMORPHONE HYDROCHLORIDE 0.5 MG: 1 INJECTION, SOLUTION INTRAMUSCULAR; INTRAVENOUS; SUBCUTANEOUS at 23:10

## 2018-01-01 RX ADMIN — HYDROMORPHONE HYDROCHLORIDE 0.5 MG: 1 INJECTION, SOLUTION INTRAMUSCULAR; INTRAVENOUS; SUBCUTANEOUS at 15:52

## 2018-01-01 RX ADMIN — PANTOPRAZOLE SODIUM 40 MG: 40 TABLET, DELAYED RELEASE ORAL at 06:24

## 2018-01-01 RX ADMIN — SODIUM CHLORIDE 125 ML/HR: 9 INJECTION, SOLUTION INTRAVENOUS at 01:40

## 2018-01-01 RX ADMIN — POTASSIUM CHLORIDE 40 MEQ: 750 CAPSULE, EXTENDED RELEASE ORAL at 11:04

## 2018-01-01 RX ADMIN — DULOXETINE HYDROCHLORIDE 30 MG: 30 CAPSULE, DELAYED RELEASE ORAL at 20:41

## 2018-01-01 RX ADMIN — HYDROCODONE BITARTRATE AND ACETAMINOPHEN 1 TABLET: 7.5; 325 TABLET ORAL at 12:36

## 2018-01-01 RX ADMIN — SODIUM CHLORIDE, PRESERVATIVE FREE 3 ML: 5 INJECTION INTRAVENOUS at 10:38

## 2018-01-01 RX ADMIN — HYDROCODONE BITARTRATE AND ACETAMINOPHEN 1 TABLET: 7.5; 325 TABLET ORAL at 08:21

## 2018-01-01 RX ADMIN — ALUMINUM HYDROXIDE, MAGNESIUM HYDROXIDE, AND DIMETHICONE 15 ML: 400; 400; 40 SUSPENSION ORAL at 16:03

## 2018-01-01 RX ADMIN — ATORVASTATIN CALCIUM 20 MG: 20 TABLET, FILM COATED ORAL at 21:17

## 2018-01-01 RX ADMIN — ALPRAZOLAM 0.5 MG: 0.5 TABLET ORAL at 23:09

## 2018-01-01 RX ADMIN — POTASSIUM CHLORIDE 10 MEQ: 7.46 INJECTION, SOLUTION INTRAVENOUS at 01:48

## 2018-01-01 RX ADMIN — PANTOPRAZOLE SODIUM 40 MG: 40 TABLET, DELAYED RELEASE ORAL at 11:04

## 2018-01-01 RX ADMIN — SODIUM CHLORIDE 125 ML/HR: 9 INJECTION, SOLUTION INTRAVENOUS at 10:15

## 2018-01-01 RX ADMIN — HYDROMORPHONE HYDROCHLORIDE 0.5 MG: 1 INJECTION, SOLUTION INTRAMUSCULAR; INTRAVENOUS; SUBCUTANEOUS at 11:05

## 2018-01-01 RX ADMIN — LEVOTHYROXINE SODIUM 125 MCG: 125 TABLET ORAL at 06:00

## 2018-01-01 RX ADMIN — TRAZODONE HYDROCHLORIDE 50 MG: 50 TABLET ORAL at 20:40

## 2018-01-01 RX ADMIN — HYDROMORPHONE HYDROCHLORIDE 0.5 MG: 1 INJECTION, SOLUTION INTRAMUSCULAR; INTRAVENOUS; SUBCUTANEOUS at 18:30

## 2018-01-01 RX ADMIN — Medication 81 MG: at 07:34

## 2018-01-01 RX ADMIN — FAMOTIDINE 20 MG: 20 TABLET, FILM COATED ORAL at 12:58

## 2018-01-01 RX ADMIN — HYDROMORPHONE HYDROCHLORIDE 0.5 MG: 1 INJECTION, SOLUTION INTRAMUSCULAR; INTRAVENOUS; SUBCUTANEOUS at 07:31

## 2018-01-01 RX ADMIN — MULTIPLE VITAMINS W/ MINERALS TAB 1 TABLET: TAB at 12:59

## 2018-01-01 RX ADMIN — PANTOPRAZOLE SODIUM 40 MG: 40 TABLET, DELAYED RELEASE ORAL at 06:55

## 2018-01-01 RX ADMIN — DULOXETINE HYDROCHLORIDE 30 MG: 30 CAPSULE, DELAYED RELEASE ORAL at 12:59

## 2018-01-01 RX ADMIN — CARVEDILOL 12.5 MG: 12.5 TABLET, FILM COATED ORAL at 20:41

## 2018-01-01 RX ADMIN — ATORVASTATIN CALCIUM 20 MG: 20 TABLET, FILM COATED ORAL at 20:41

## 2018-01-01 RX ADMIN — PREDNISONE 20 MG: 20 TABLET ORAL at 07:34

## 2018-01-01 RX ADMIN — ISOSORBIDE MONONITRATE 30 MG: 30 TABLET ORAL at 08:21

## 2018-01-01 RX ADMIN — OXYCODONE AND ACETAMINOPHEN 1 TABLET: 5; 325 TABLET ORAL at 00:32

## 2018-01-01 RX ADMIN — POTASSIUM CHLORIDE AND SODIUM CHLORIDE 100 ML/HR: 900; 150 INJECTION, SOLUTION INTRAVENOUS at 23:10

## 2018-01-01 RX ADMIN — POTASSIUM CHLORIDE 40 MEQ: 750 CAPSULE, EXTENDED RELEASE ORAL at 19:21

## 2018-01-01 RX ADMIN — POTASSIUM CHLORIDE AND SODIUM CHLORIDE 100 ML/HR: 900; 150 INJECTION, SOLUTION INTRAVENOUS at 13:00

## 2018-01-01 RX ADMIN — ISOSORBIDE MONONITRATE 30 MG: 30 TABLET ORAL at 12:59

## 2018-01-01 RX ADMIN — ISOSORBIDE MONONITRATE 30 MG: 30 TABLET ORAL at 09:28

## 2018-01-01 RX ADMIN — LEVOTHYROXINE SODIUM 125 MCG: 125 TABLET ORAL at 12:59

## 2018-01-01 RX ADMIN — SODIUM CHLORIDE, PRESERVATIVE FREE 3 ML: 5 INJECTION INTRAVENOUS at 10:07

## 2018-01-01 RX ADMIN — SODIUM CHLORIDE, PRESERVATIVE FREE 3 ML: 5 INJECTION INTRAVENOUS at 13:01

## 2018-01-01 RX ADMIN — LEVOTHYROXINE SODIUM 125 MCG: 125 TABLET ORAL at 06:24

## 2018-01-01 RX ADMIN — ALUMINUM HYDROXIDE, MAGNESIUM HYDROXIDE, AND DIMETHICONE 15 ML: 400; 400; 40 SUSPENSION ORAL at 19:55

## 2018-01-01 RX ADMIN — HYDROCODONE BITARTRATE AND ACETAMINOPHEN 1 TABLET: 5; 325 TABLET ORAL at 06:35

## 2018-01-01 RX ADMIN — CARVEDILOL 12.5 MG: 12.5 TABLET, FILM COATED ORAL at 21:17

## 2018-01-01 RX ADMIN — AMLODIPINE BESYLATE 10 MG: 10 TABLET ORAL at 08:21

## 2018-01-01 RX ADMIN — CARVEDILOL 12.5 MG: 12.5 TABLET, FILM COATED ORAL at 08:21

## 2018-01-01 RX ADMIN — HYDROCODONE BITARTRATE AND ACETAMINOPHEN 1 TABLET: 5; 325 TABLET ORAL at 22:02

## 2018-01-01 RX ADMIN — SODIUM CHLORIDE, PRESERVATIVE FREE 3 ML: 5 INJECTION INTRAVENOUS at 08:20

## 2018-01-01 RX ADMIN — AMLODIPINE BESYLATE 10 MG: 10 TABLET ORAL at 09:28

## 2018-01-01 RX ADMIN — CARVEDILOL 12.5 MG: 12.5 TABLET, FILM COATED ORAL at 12:59

## 2018-01-01 RX ADMIN — HYDROCODONE BITARTRATE AND ACETAMINOPHEN 1 TABLET: 7.5; 325 TABLET ORAL at 23:08

## 2018-01-01 RX ADMIN — HYDROCODONE BITARTRATE AND ACETAMINOPHEN 1 TABLET: 7.5; 325 TABLET ORAL at 22:56

## 2018-01-01 RX ADMIN — MULTIPLE VITAMINS W/ MINERALS TAB 1 TABLET: TAB at 09:28

## 2018-01-01 RX ADMIN — PREDNISONE 20 MG: 20 TABLET ORAL at 08:20

## 2018-01-01 RX ADMIN — PREDNISONE 20 MG: 20 TABLET ORAL at 12:59

## 2018-01-01 RX ADMIN — DULOXETINE HYDROCHLORIDE 30 MG: 30 CAPSULE, DELAYED RELEASE ORAL at 07:34

## 2018-01-01 RX ADMIN — Medication 81 MG: at 09:28

## 2018-01-01 RX ADMIN — HYDROMORPHONE HYDROCHLORIDE 0.5 MG: 1 INJECTION, SOLUTION INTRAMUSCULAR; INTRAVENOUS; SUBCUTANEOUS at 19:55

## 2018-01-01 RX ADMIN — DULOXETINE HYDROCHLORIDE 30 MG: 30 CAPSULE, DELAYED RELEASE ORAL at 09:28

## 2018-01-01 RX ADMIN — HYDROMORPHONE HYDROCHLORIDE 0.5 MG: 1 INJECTION, SOLUTION INTRAMUSCULAR; INTRAVENOUS; SUBCUTANEOUS at 14:47

## 2018-01-01 RX ADMIN — HYDROMORPHONE HYDROCHLORIDE 0.5 MG: 1 INJECTION, SOLUTION INTRAMUSCULAR; INTRAVENOUS; SUBCUTANEOUS at 12:58

## 2018-01-01 RX ADMIN — ALPRAZOLAM 0.5 MG: 0.5 TABLET ORAL at 14:46

## 2018-01-01 RX ADMIN — DULOXETINE HYDROCHLORIDE 30 MG: 30 CAPSULE, DELAYED RELEASE ORAL at 08:21

## 2018-01-01 RX ADMIN — POTASSIUM CHLORIDE 40 MEQ: 750 CAPSULE, EXTENDED RELEASE ORAL at 15:27

## 2018-01-01 RX ADMIN — Medication 81 MG: at 13:00

## 2018-02-11 ENCOUNTER — LAB REQUISITION (OUTPATIENT)
Dept: LAB | Facility: HOSPITAL | Age: 73
End: 2018-02-11

## 2018-02-11 DIAGNOSIS — I25.10 ATHEROSCLEROTIC HEART DISEASE OF NATIVE CORONARY ARTERY WITHOUT ANGINA PECTORIS: ICD-10-CM

## 2018-02-11 DIAGNOSIS — I10 ESSENTIAL (PRIMARY) HYPERTENSION: ICD-10-CM

## 2018-02-11 LAB
ANION GAP SERPL CALCULATED.3IONS-SCNC: 14 MMOL/L
BASOPHILS # BLD AUTO: 0.05 10*3/MM3 (ref 0–0.2)
BASOPHILS NFR BLD AUTO: 0.4 % (ref 0–2)
BUN BLD-MCNC: 22 MG/DL (ref 8–23)
BUN/CREAT SERPL: 16.4 (ref 7–25)
CALCIUM SPEC-SCNC: 8.8 MG/DL (ref 8.8–10.5)
CHLORIDE SERPL-SCNC: 93 MMOL/L (ref 98–107)
CO2 SERPL-SCNC: 28 MMOL/L (ref 22–29)
CREAT BLD-MCNC: 1.34 MG/DL (ref 0.57–1)
DEPRECATED RDW RBC AUTO: 55.4 FL (ref 37–54)
EOSINOPHIL # BLD AUTO: 0.04 10*3/MM3 (ref 0.1–0.3)
EOSINOPHIL NFR BLD AUTO: 0.4 % (ref 0–4)
ERYTHROCYTE [DISTWIDTH] IN BLOOD BY AUTOMATED COUNT: 15.6 % (ref 11.5–14.5)
ERYTHROCYTE [SEDIMENTATION RATE] IN BLOOD: 49 MM/HR (ref 0–20)
GFR SERPL CREATININE-BSD FRML MDRD: 39 ML/MIN/1.73
GLUCOSE BLD-MCNC: 146 MG/DL (ref 65–99)
HCT VFR BLD AUTO: 26.9 % (ref 37–47)
HGB BLD-MCNC: 8.8 G/DL (ref 12–16)
IMM GRANULOCYTES # BLD: 0.19 10*3/MM3 (ref 0–0.03)
IMM GRANULOCYTES NFR BLD: 1.7 % (ref 0–0.5)
LYMPHOCYTES # BLD AUTO: 2.45 10*3/MM3 (ref 0.6–4.8)
LYMPHOCYTES NFR BLD AUTO: 21.8 % (ref 20–45)
MCH RBC QN AUTO: 31.9 PG (ref 27–31)
MCHC RBC AUTO-ENTMCNC: 32.7 G/DL (ref 31–37)
MCV RBC AUTO: 97.5 FL (ref 81–99)
MONOCYTES # BLD AUTO: 0.69 10*3/MM3 (ref 0–1)
MONOCYTES NFR BLD AUTO: 6.1 % (ref 3–8)
NEUTROPHILS # BLD AUTO: 7.8 10*3/MM3 (ref 1.5–8.3)
NEUTROPHILS NFR BLD AUTO: 69.6 % (ref 45–70)
NRBC BLD MANUAL-RTO: 0 /100 WBC (ref 0–0)
PLATELET # BLD AUTO: 339 10*3/MM3 (ref 140–500)
PMV BLD AUTO: 9.8 FL (ref 7.4–10.4)
POTASSIUM BLD-SCNC: 3.8 MMOL/L (ref 3.5–5.2)
RBC # BLD AUTO: 2.76 10*6/MM3 (ref 4.2–5.4)
SODIUM BLD-SCNC: 135 MMOL/L (ref 136–145)
WBC NRBC COR # BLD: 11.22 10*3/MM3 (ref 4.8–10.8)

## 2018-02-11 PROCEDURE — 85652 RBC SED RATE AUTOMATED: CPT | Performed by: INTERNAL MEDICINE

## 2018-02-11 PROCEDURE — 85025 COMPLETE CBC W/AUTO DIFF WBC: CPT | Performed by: INTERNAL MEDICINE

## 2018-02-11 PROCEDURE — 86140 C-REACTIVE PROTEIN: CPT | Performed by: INTERNAL MEDICINE

## 2018-02-11 PROCEDURE — 80048 BASIC METABOLIC PNL TOTAL CA: CPT | Performed by: INTERNAL MEDICINE

## 2018-02-12 LAB — CRP SERPL-MCNC: 2.72 MG/DL (ref 0–0.5)

## 2018-08-13 NOTE — PROGRESS NOTES
Subjective   Patient ID: Saba Bonner is a 73 y.o. female is here today as a self referral for lower back pain that radiates into the right leg causing numbness in her right foot. Patient had a L3 kyphoplasty by Dr Brush in 08/01/2014 and removal of L4-L5 bilateral transcortical pedicle screws and rods, L4 kyphoplasty, open lumbar decompression L3-L4 and a right L3-L4 diskectomy and fragmentectomy on 09/05/14. Patient had fallen in her bathroom 07/18/18 and that is when th pain first started. Patrient is currently taking Percocet 5-325 PRN for pain.       Ha returns to the office today for follow-up on back pain and right leg numbness.  She has a history of previous lumbar surgery and open kyphoplasty procedure by Dr. Brush in 2014. This surgery was performed after a workman's comp injury.  The patient had a fall and sustained compression fractures.  At the time of her last office visit in June of 2015, she was getting around in a motorized scooter.  She had reached maximum medical improvement at that time.  She was subsequently terminated from her job.  Her last office visit with Dr. Brush was in October 2015.  Plan was to continue with physical therapy.  She has not been seen in our office since then.      Since her last office visit, the patient has undergone a L total knee replacement last January of this year. The knee replacement failed and she underwent subsequent revision fusion of the left knee.  She stated that there were complications during surgery which included cardiac arrest.  She was successfully resuscitated but had a long postoperative course.  In March of this year she underwent subsequent I&D of the left knee for MRSA wound infection.     Ha is essentially non-ambulatory. Her left knee is in a fixed position. She has bilateral wraps to her lower extremities secondary to venous insufficiency.    According to Dr. Bonner, she experienced a fall nearly one month ago. Since that  time she has been having worsening low or lumbosacral pain with radiation to the right hip region.  She is fearful that she may have another compression fracture.       Back Pain   This is a chronic problem. The current episode started more than 1 year ago. The problem occurs rarely. The pain is present in the lumbar spine. The quality of the pain is described as burning and shooting. The pain radiates to the left knee, left thigh, right foot, right knee, right thigh and left foot. The pain is at a severity of 10/10. The pain is severe. The pain is the same all the time. The symptoms are aggravated by position, sitting and standing. Associated symptoms include leg pain, numbness, tingling and weakness. Pertinent negatives include no bladder incontinence, bowel incontinence or chest pain. She has tried bed rest, muscle relaxant and analgesics for the symptoms. The treatment provided mild relief.       The following portions of the patient's history were reviewed and updated as appropriate: allergies, current medications, past family history, past medical history, past social history, past surgical history and problem list.    Review of Systems   HENT: Positive for postnasal drip.    Respiratory: Positive for wheezing. Negative for chest tightness and shortness of breath.    Cardiovascular: Negative for chest pain.   Gastrointestinal: Negative for bowel incontinence.   Endocrine: Positive for cold intolerance.   Genitourinary: Positive for enuresis. Negative for bladder incontinence.   Musculoskeletal: Positive for arthralgias, back pain, gait problem, joint swelling, myalgias, neck pain and neck stiffness.   Neurological: Positive for tingling, weakness and numbness.   Hematological: Bruises/bleeds easily.   All other systems reviewed and are negative.      Objective   Physical Exam   Constitutional: She is oriented to person, place, and time. She appears well-developed and well-nourished. She is cooperative. No  distress.   HENT:   Head: Normocephalic and atraumatic.   Eyes: Pupils are equal, round, and reactive to light. Conjunctivae and EOM are normal.   Neck: Normal range of motion. Neck supple.   Cardiovascular: Normal rate.    Pulmonary/Chest: Effort normal. No respiratory distress.   Abdominal: Soft. She exhibits no distension. There is no tenderness.   Musculoskeletal: She exhibits tenderness (with palpation in the lowerlumbosacral region particularly toward the right.). She exhibits no edema.   Neurological: She is alert and oriented to person, place, and time. She displays no atrophy, no tremor and normal reflexes. No sensory deficit. She exhibits normal muscle tone. Coordination (patient has chronic difficulty with walking.  She is been using a motorized scooter to get around for quite some time.) abnormal. GCS eye subscore is 4. GCS verbal subscore is 5. GCS motor subscore is 6.   Reflex Scores:       Tricep reflexes are 2+ on the right side and 2+ on the left side.       Bicep reflexes are 2+ on the right side and 2+ on the left side.       Brachioradialis reflexes are 2+ on the right side and 2+ on the left side.  Patient is able to move all 4 extremities but does have difficulty with the left knee.  The left leg is in a fixed position due to the left knee fusion.  Sensation is intact bilaterally.  DTRs normal in the upper extremities.  Unable to test lower extremities due to leg wraps and left knee fusion.  Negative Burt's; negative clonus.    Skin: Skin is warm and dry. No rash noted. She is not diaphoretic.   Multiple bruises noted to both arms.  Bilateral lower leg ace wraps intact.  Poor skin turgor.   Psychiatric: She has a normal mood and affect. Thought content normal.   Vitals reviewed.    Neurologic Exam     Mental Status   Oriented to person, place, and time.     Cranial Nerves     CN III, IV, VI   Pupils are equal, round, and reactive to light.  Extraocular motions are normal.     Motor Exam      Strength   Right deltoid: 5/5  Left deltoid: 5/5  Right biceps: 5/5  Left biceps: 5/5  Right triceps: 5/5  Left triceps: 5/5  Right wrist flexion: 5/5  Left wrist flexion: 5/5  Right wrist extension: 5/5  Left wrist extension: 5/5  Right interossei: 5/5  Left interossei: 5/5  Right iliopsoas: 4/5  Left iliopsoas: 4/5  Right quadriceps: 4/5  Left quadriceps: 0/5  Right posterior tibial: 4/5  Left posterior tibial: 4/5  Right gastroc: 4/5  Left gastroc: 4/5    Gait, Coordination, and Reflexes     Reflexes   Right brachioradialis: 2+  Left brachioradialis: 2+  Right biceps: 2+  Left biceps: 2+  Right triceps: 2+  Left triceps: 2+      Assessment/Plan   Independent Review of Radiographic Studies:      No new radiographic images to review.    Review of Medical Records    I did review multiple notes from her previous hospitalization, previous surgical notes and infectious disease progress notes from [facility] in the office and review of lab results.     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  The pain started after a fall approximately 1 month ago.  She has had a complicated medical course since her last office visit with Dr. Brush a couple of years ago.  She has undergone a left knee replacement with subsequent complication of fusion of the left knee.  There was cardiac arrest during the surgery requiring resuscitation.  She also developed a wound infection and has had a long course of IV antibiotics. She is still being followed by infectious disease at [facility].  She has completed IV antibiotic therapy.  There was some history of acute kidney injury which had required dosage adjustment of antibiotics.  Her last GFR was 37. She resides at [facility], a skilled nursing facility.     Dr. Bonner has a history of lumbar vertebral compression fractures.  I will get an MRI  of the lumbar spine to assess for any new fractures.  Her leg pain does not seem to be that problematic and therefore I will not do contrasted MRI imaging given the poor state of her kidneys.  She will return to the office after the MRI for reevaluation with Dr. Brush.      Saba was seen today for back pain.    Diagnoses and all orders for this visit:    Lumbosacral pain  -     MRI Lumbar Spine Without Contrast; Future    Fall, initial encounter  -     MRI Lumbar Spine Without Contrast; Future      Return for after radiographic imaging with Dr. Brush.

## 2018-08-17 NOTE — TELEPHONE ENCOUNTER
Patient voicemail is full (was going to let the patient know that I am still working on her appointment with Dr. Brush and will call her on monday).

## 2018-08-20 NOTE — TELEPHONE ENCOUNTER
Called patient and her voicemail is full, unable to leave a message.  Mailed appointment reminder.

## 2018-08-24 NOTE — TELEPHONE ENCOUNTER
The patient would like for you to call her with the results of her MRI that was done yesterday. She has a follow up appt with you on 8/31/18.

## 2018-08-27 NOTE — TELEPHONE ENCOUNTER
The patient has called the office again. She is trying to get her MRI results. She would like for you to call her with the results. I told her that she has a follow up appt on 8/31/18. She was not happy and did not want to wait until then for the results.

## 2018-08-31 PROBLEM — I87.8 VENOUS STASIS: Status: ACTIVE | Noted: 2018-01-01

## 2018-08-31 PROBLEM — M51.16 HERNIATION OF LUMBAR INTERVERTEBRAL DISC WITH RADICULOPATHY: Status: ACTIVE | Noted: 2018-01-01

## 2018-09-11 NOTE — PROGRESS NOTES
CHIEF COMPLAINT  New pt c/o pain that is in the lower right spine and radiates to the right buttocks. States she has a ruptured disk L5/S1 and it has been very painful and it started 2 months ago. She has been on oxycodone for months she states. Before the injury, she was only taking at bed time but since the back injury has been taking 2-3 times a day. She takes Ibuprofen 2-3 times a day. (was or. Taking pain medicine for RA related pain.) States she has had epidural injections in the past by Dr. Brush. She hasn't had any since her back surgeries. She has hx of back surgeries by Dr. Brush. Recent MRI from 8-23-18. Sent for med management and injections.     Subjective   Saba Bonner is a 73 y.o. female.   She presents to the office for evaluation of back pain. She was referred here by Dr. Brush.     Patient states back pain started 4-5 years ago.  History of surgery with Dr. Brush X 2.  L3 kyphoplasty 08/01/2014 and removal of L4-L5 bilateral transcortical pedicle screws and rods, L4 kyphoplasty, open lumbar decompression L3-L4 and a right L3-L4 diskectomy and fragmentectomy on 09/05/14.  Reports that she was managing the pain up until recently.  She fell in the bathroom two months ago and has had significant pain in the back and left leg since that time.  She recently had an MRI and follow up with Dr. Brush who recommends a series of lumbar ABDULKADIR's.      C/o right lumbosacral area pain and radiates into the right buttock.  Pain today is 8/10 in severity. Pain is aggravated by prolonged sitting. Improved with laying flat.  She is also taking Percocet 5/325 3/day, Ibuprofen 400 mg TID.  Reports moderate reduction with this, but pain is still at severe levels at this time.      Left knee replacement in January of this year which failed and she underwent a subsequent revision on fusion of the left knee.  Her mobility has been severely limited since that time.  Reports that up until three years  ago she was working in her wheelchair. She was a pediatric gastroenterologist.      She is taking Coumadin for history of bilateral PE's three years ago.      History of Present Illness     PEG Assessment   What number best describes your pain on average in the past week?9  What number best describes how, during the past week, pain has interfered with your enjoyment of life?9  What number best describes how, during the past week, pain has interfered with your general activity?  9      Current Outpatient Prescriptions:   •  ALPRAZolam (XANAX) 0.5 MG tablet, Take 0.5 mg by mouth 2 (Two) Times a Day As Needed for Anxiety., Disp: , Rfl:   •  amLODIPine (NORVASC) 10 MG tablet, Take 10 mg by mouth Daily., Disp: , Rfl:   •  aspirin 81 MG chewable tablet, Chew 81 mg Daily., Disp: , Rfl:   •  atorvastatin (LIPITOR) 10 MG tablet, Take 20 mg by mouth Every Night., Disp: , Rfl:   •  carvedilol (COREG) 12.5 MG tablet, Take 0.5 tablets by mouth Every 12 (Twelve) Hours. (Patient taking differently: Take 6.25 mg by mouth Every 12 (Twelve) Hours. Decreased to 6.25mg), Disp: 60 tablet, Rfl: 0  •  doxycycline (VIBRAMYCIN) 100 MG capsule, Take 100 mg by mouth 2 (Two) Times a Day., Disp: , Rfl:   •  DULoxetine (CYMBALTA) 30 MG capsule, Take 3 capsules by mouth Daily. (Patient taking differently: Take 30 mg by mouth 2 (Two) Times a Day.), Disp: , Rfl:   •  levothyroxine (SYNTHROID, LEVOTHROID) 125 MCG tablet, Take 125 mcg by mouth Daily., Disp: , Rfl:   •  omeprazole (PriLOSEC) 20 MG capsule, Take 20 mg by mouth daily., Disp: , Rfl:   •  oxyCODONE-acetaminophen (PERCOCET) 5-325 MG per tablet, Take 1 tablet by mouth Every 8 (Eight) Hours As Needed for moderate pain (4-6)., Disp: 10 tablet, Rfl: 0  •  predniSONE (DELTASONE) 10 MG tablet, Take 10 mg by mouth Daily., Disp: , Rfl:   •  traZODone (DESYREL) 50 MG tablet, Take 50 mg by mouth Every Night., Disp: , Rfl:   •  warfarin (COUMADIN) 3 MG tablet, Take 3 mg by mouth Daily., Disp: , Rfl:    •  Multiple Vitamins-Minerals (MULTIVITAMIN ADULT PO), Take 1 tablet by mouth daily., Disp: , Rfl:     The following portions of the patient's history were reviewed and updated as appropriate: allergies, current medications, past family history, past medical history, past social history, past surgical history and problem list.    REVIEW OF PERTINENT MEDICAL DATA    Reviewed the office visit encounter from 8/31/2018 with Dr. Tico Brush ---  has been through many, K medical issues over the last 3 years.  Problems with left knee and eventually required fusion and larry.  This was further complicated by sepsis and a cardiac arrest.  She is now on Coumadin for history of pulmonary emboli.  She has recently had increased low back pain and right buttock and posterior thigh pain.  Reviewed MRI which shows a new process at L5 S1 S1 nerve root compression.  She is in a wheelchair today.  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  Blade nonoperative management approach is best.  Sunday pain management for epidural blocks.              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  Lives in senior/independent living.  Does not use tobacco. Does not use illicit drugs.  Drinks about 5 glasses of wine each week.        Review of Systems   Constitutional: Positive for activity change and fatigue. Negative for chills and fever.   Respiratory: Positive for apnea (borderline, does not wear machine pt states). Negative for cough, shortness of breath and wheezing.    Cardiovascular: Positive for leg swelling (pt has venous insufficency and legs are wrapped in ace wrap). Negative for chest pain.   Gastrointestinal: Negative for constipation, diarrhea, nausea and vomiting.   Genitourinary: Positive for frequency (noticed in last week or so). Negative for difficulty urinating.   Musculoskeletal: Positive for gait problem (pt is in motorized chair and uses wheelchair).   Neurological:  "Positive for weakness and numbness (half of right foot is numb-increased with back pain). Negative for dizziness, light-headedness and headaches.   Psychiatric/Behavioral: Positive for sleep disturbance. Negative for agitation, confusion, hallucinations and suicidal ideas. The patient is not nervous/anxious.      Vitals:    09/11/18 1508   BP: 103/61   Pulse: 71   Resp: 15   Temp: 98.2 °F (36.8 °C)   SpO2: 94%   Weight: 83.9 kg (185 lb)   Height: 149.9 cm (59.02\")   PainSc:   8   PainLoc: Back     Objective   Physical Exam   Constitutional: She is oriented to person, place, and time. She appears well-developed and well-nourished. No distress.   HENT:   Head: Normocephalic and atraumatic.   Right Ear: External ear normal.   Left Ear: External ear normal.   Eyes: Pupils are equal, round, and reactive to light. Conjunctivae and EOM are normal.   Neck: Neck supple.   Cardiovascular: Normal rate, regular rhythm and normal heart sounds.    Pulmonary/Chest: Effort normal and breath sounds normal. No respiratory distress.   Abdominal: Soft. Bowel sounds are normal. There is no tenderness.   Obese    Musculoskeletal:        Left knee: She exhibits decreased range of motion (fused ).        Lumbar back: She exhibits decreased range of motion, tenderness, bony tenderness and pain.   Surgical scars left knee and lumbar spine    Neurological: She is alert and oriented to person, place, and time. A sensory deficit (bilateral feet) is present. Gait (motorized wheelchair) abnormal.   Reflex Scores:       Patellar reflexes are 1+ on the right side and 0 on the left side.       Achilles reflexes are 1+ on the right side and 1+ on the left side.  +SLR bilaterally, right > left    Skin: Skin is warm and dry. She is not diaphoretic.   Bilateral legs wrapped    Psychiatric: She has a normal mood and affect. Her speech is normal and behavior is normal. Judgment and thought content normal. Cognition and memory are normal.   Nursing note " and vitals reviewed.      Assessment/Plan   Saba was seen today for back pain.    Diagnoses and all orders for this visit:    Other chronic pain  -     Oral Fluid Drug Screen - Saliva, Oral Cavity; Future    Herniation of lumbar intervertebral disc with radiculopathy  -     Case Request  -     Oral Fluid Drug Screen - Saliva, Oral Cavity; Future    Lumbar radiculopathy  -     Case Request  -     Oral Fluid Drug Screen - Saliva, Oral Cavity; Future    Anticoagulated      --- Right S1 LTFESI X 2, 2-4 weeks apart. Reviewed the procedure at length with the patient.  Included in the review was expectations, complications, risk and benefits.The procedure was described in detail and the risks, benefits and alternatives were discussed with the patient (including but not limited to: bleeding, infection, nerve damage, worsening of pain, inability to perform injection, paralysis, seizures, and death) who agreed to proceed.  Discussed the potential for sedation if warranted/wanted.  Questions were answered and in a way the patient could understand.  Patient verbalized understanding and wishes to proceed.  This intervention will be ordered.  --- Hold Coumadin X 5 days prior to procedure  --- Routine ODT in office today as part of monitoring requirements for controlled substances.  This specimen will be sent to [facility] laboratory for confirmation.     --- Continue pain medication per PCP   --- Follow-up after procedure          HOLGER REPORT    As part of the patient's treatment plan, I am prescribing controlled substances. The patient has been made aware of appropriate use of such medications, including potential risk of somnolence, limited ability to drive and/or work safely, and the potential for dependence or overdose. It has also bee made clear that these medications are for use by this patient only, without concomitant use of alcohol or other substances unless prescribed.     Patient has completed prescribing agreement  detailing terms of continued prescribing of controlled substances, including monitoring HOLGER reports, urine drug screening, and pill counts if necessary. The patient is aware that inappropriate use will results in cessation of prescribing such medications.    HOLGER report has been reviewed and scanned into the patient's chart.    As the clinician, I personally reviewed the HOLGER from 9/10/2018 while the patient was in the office today.    History and physical exam exhibit continued safe and appropriate use of controlled substances.     EMR Dragon/Transcription disclaimer:   Much of this encounter note is an electronic transcription/translation of spoken language to printed text. The electronic translation of spoken language may permit erroneous, or at times, nonsensical words or phrases to be inadvertently transcribed; Although I have reviewed the note for such errors, some may still exist.

## 2018-09-13 PROBLEM — Z79.01 ANTICOAGULATED: Status: ACTIVE | Noted: 2018-01-01

## 2018-09-13 PROBLEM — M54.16 LUMBAR RADICULOPATHY: Status: ACTIVE | Noted: 2018-01-01

## 2018-09-19 NOTE — PROGRESS NOTES
Right S1 Lumbar Transforaminal Epidural Steroid Injection  [facility]      PREOPERATIVE DIAGNOSIS:  Lumbar Disc Displacement, Other Chronic Pain, right Lumbar Radiculopathy and Warfarin held appropriately for today    POSTOPERATIVE DIAGNOSIS:  Same as preop diagnosis    PROCEDURE:  CPT 72157 --  Diagnostic Transforaminal Epidural Steroid Injection at the S1 level, on the RIGHT    PRE-PROCEDURE DISCUSSION WITH PATIENT:    Risks and complications were discussed with the patient prior to starting the procedure and informed consent was obtained.  We discussed various topics including but not limited to bleeding, infection, injury, nerve injury, paralysis, coma, death, postprocedural painful flare-up, postprocedural site soreness, and a lack of pain relief.  We discussed the diagnostic aspect of transforaminal epidural / selective nerve root blockade.    SURGEON:  Flynn Baker MD    REASON FOR PROCEDURE:    Diagnostic injection at this level is needed and Radicular pain pattern seems consistent with this dermatome.    SEDATION:  Versed 6mg & Fentanyl 100 mcg IV  ANESTHETIC:  Marcaine 0.25%  STEROID:  Methylprednisolone (DEPO MEDROL) 80mg/ml    DESCRIPTON OF PROCEDURE:  After obtaining informed consent, an I.V. was started in the preoperative area. The patient taken to the operating room and was placed in the prone position with a pillow under the abdomen.  All pressure points were well padded.  EKG, blood pressure, and pulse oximeter were monitored.  The lumbosacral area was prepped with Chloraprep and draped in a sterile fashion. Under fluoroscopic guidance the upper vertebral-equivalent level of the sacrum was identified, and in a slightly oblique view, the S1 posterior foramen was identified, and then a point just below the foramen at 6 o'clock position was identified. Skin and subcutaneous tissue was anesthetized with 1% lidocaine. A 22-gauge spinal needle was introduced under fluoroscopic  guidance at the above junction and then redirected slightly cephalad and into the foramen without parasthesias and into the epidural space. After confirming the position of the needle with PA, lateral, and oblique fluoroscopic views, aspiration was checked and was clear of blood or CSF.  Next, 1 mL of Omnipaque was injected. After seeing adequate spread on the corresponding nerve root, a total volume 3mL of injectate containing 1ml of the above mentioned local anesthetic, 1 ml saline,  and the above mentioned corticosteroid was injected into the epidural space.    The needle was removed intact.      ESTIMATED BLOOD LOSS:  <5 mL  SPECIMENS:  none    COMPLICATIONS:   No complications were noted., There was no indication of vascular uptake on live injection of contrast dye. and The patient did not have any signs of postprocedure numbness nor weakness.    TOLERANCE & DISCHARGE CONDITION:    The patient tolerated the procedure well.  The patient was transported to the recovery area without difficulties.  The patient was discharged to home under the care of family in stable and satisfactory condition.    PLAN OF CARE:  1. The patient was given our standard instruction sheet.  2. The patient will Repeat injection 2-4 wks.  3. The patient will resume all medications as per the medication reconciliation sheet.

## 2018-10-10 NOTE — PROGRESS NOTES
Right S1 Lumbar Transforaminal Epidural Steroid Injection  [facility]      PREOPERATIVE DIAGNOSIS:  Lumbar Degenerative Disc Disease and right Lumbar Radiculopathy    POSTOPERATIVE DIAGNOSIS:  Same as preop diagnosis    PROCEDURE:  CPT 62872 --  Diagnostic Transforaminal Epidural Steroid Injection at the S1 level, on the RIGHT    PRE-PROCEDURE DISCUSSION WITH PATIENT:    Risks and complications were discussed with the patient prior to starting the procedure and informed consent was obtained.  We discussed various topics including but not limited to bleeding, infection, injury, nerve injury, paralysis, coma, death, postprocedural painful flare-up, postprocedural site soreness, and a lack of pain relief.  We discussed the diagnostic aspect of transforaminal epidural / selective nerve root blockade.    SURGEON:  Flynn Baker MD    REASON FOR PROCEDURE:    Making some clinical improvement after the previous procedure. and Radiating pattern of pain is likely consistent with degenerative changes in the area.  It is of note that Dr. Bonner appropriately held Warfarin for procedure today.      SEDATION:  Versed 6mg & Fentanyl 100 mcg IV  ANESTHETIC:  Marcaine 0.25%  STEROID:  Methylprednisolone (DEPO MEDROL) 80mg/ml    DESCRIPTON OF PROCEDURE:  After obtaining informed consent, an I.V. was started in the preoperative area. The patient taken to the operating room and was placed in the prone position with a pillow under the abdomen.  All pressure points were well padded.  EKG, blood pressure, and pulse oximeter were monitored.  The lumbosacral area was prepped with Chloraprep and draped in a sterile fashion. Under fluoroscopic guidance the upper vertebral-equivalent level of the sacrum was identified, and in a slightly oblique view, the S1 posterior foramen was identified, and then a point just below the foramen at 6 o'clock position was identified. Skin and subcutaneous tissue was anesthetized with  1% lidocaine. A 22-gauge spinal needle was introduced under fluoroscopic guidance at the above junction and then redirected slightly cephalad and into the foramen without parasthesias and into the epidural space. After confirming the position of the needle with PA, lateral, and oblique fluoroscopic views, aspiration was checked and was clear of blood or CSF.  Next, 1 mL of Omnipaque was injected. After seeing adequate spread on the corresponding nerve root, a total volume 3mL of injectate containing 1ml of the above mentioned local anesthetic, 1 ml saline,  and the above mentioned corticosteroid was injected into the epidural space.    The needle was removed intact.      ESTIMATED BLOOD LOSS:  <5 mL  SPECIMENS:  none    COMPLICATIONS:   No complications were noted., There was no indication of vascular uptake on live injection of contrast dye. and The patient did not have any signs of postprocedure numbness nor weakness.    TOLERANCE & DISCHARGE CONDITION:    The patient tolerated the procedure well.  The patient was transported to the recovery area without difficulties.  The patient was discharged to home under the care of family in stable and satisfactory condition.    PLAN OF CARE:  1. The patient was given our standard instruction sheet.  2. The patient will Return to clinic 2 wks.  3. The patient will resume all medications as per the medication reconciliation sheet.

## 2018-11-08 NOTE — PROGRESS NOTES
CHIEF COMPLAINT  Back pain    Subjective   Saba Bonner is a 73 y.o. female  who presents to the office for follow-up of procedure.  She completed a right S1 LTFESI   on  9/18/18 and 10/8/18 performed by Dr. Baker for management of back apin. Patient reports 50% relief from the procedure for two weeks (slightly less following the second). The patient is adamant that she would like to complete a third ABDULKADIR. Her primary complaints are not right sided radicular symptoms but right lumbosacral area pain that does not radiate.      Back Pain   This is a chronic problem. The current episode started more than 1 year ago. The problem occurs daily. The problem has been gradually worsening since onset. The pain is present in the lumbar spine. The quality of the pain is described as aching and stabbing. The pain does not radiate. The pain is at a severity of 7/10. The symptoms are aggravated by sitting and position. Associated symptoms include numbness (half of right foot is numb-increased with back pain) and weakness. Pertinent negatives include no bladder incontinence, bowel incontinence, chest pain, fever or headaches. She has tried NSAIDs and analgesics for the symptoms. The treatment provided mild relief.      PEG Assessment   What number best describes your pain on average in the past week?7  What number best describes how, during the past week, pain has interfered with your enjoyment of life?9  What number best describes how, during the past week, pain has interfered with your general activity?  8    The following portions of the patient's history were reviewed and updated as appropriate: allergies, current medications, past family history, past medical history, past social history, past surgical history and problem list.    Review of Systems   Constitutional: Positive for activity change and fatigue. Negative for chills and fever.   Respiratory: Positive for apnea (borderline, does not wear machine pt states). Negative  "for cough, shortness of breath and wheezing.    Cardiovascular: Positive for leg swelling (pt has venous insufficency and legs are wrapped in ace wrap). Negative for chest pain.   Gastrointestinal: Negative for bowel incontinence, constipation, diarrhea, nausea and vomiting.   Genitourinary: Positive for difficulty urinating (incontinence) and frequency (noticed in last week or so). Negative for bladder incontinence.   Musculoskeletal: Positive for back pain and gait problem (pt is in motorized chair and uses wheelchair).   Neurological: Positive for weakness and numbness (half of right foot is numb-increased with back pain). Negative for dizziness, light-headedness and headaches.   Psychiatric/Behavioral: Positive for sleep disturbance. Negative for agitation, confusion, hallucinations and suicidal ideas. The patient is not nervous/anxious.        Vitals:    11/08/18 1013   BP: 134/74   Pulse: 72   Resp: 16   Temp: 97.8 °F (36.6 °C)   SpO2: 94%   Height: 149.9 cm (59.02\")   PainSc: 7  Comment: R sided LBP ranges from a 6-8/10   PainLoc: Back     Objective   Physical Exam   Constitutional: She is oriented to person, place, and time. She appears well-developed and well-nourished.   HENT:   Head: Normocephalic and atraumatic.   Eyes: Conjunctivae and EOM are normal.   Neck: Neck supple.   Cardiovascular: Normal rate.    Pulmonary/Chest: Effort normal. No respiratory distress.   Abdominal:   Obese    Musculoskeletal:        Left knee: She exhibits decreased range of motion.        Lumbar back: She exhibits decreased range of motion, tenderness, bony tenderness and pain.   +Right SI joint tenderness, + right SI joint provocative maneuvers   Neurological: She is alert and oriented to person, place, and time. A sensory deficit (bilateral feet) is present. Gait (motorized wheelchair) abnormal.   Skin: Skin is warm and dry.   Bilateral legs wrapped    Psychiatric: She has a normal mood and affect. Her speech is normal and " behavior is normal. Judgment and thought content normal. Cognition and memory are normal.   Nursing note and vitals reviewed.    Assessment/Plan   Saba was seen today for back pain.    Diagnoses and all orders for this visit:    Other chronic pain    Lumbar radiculopathy  -     Case Request    Herniation of lumbar intervertebral disc with radiculopathy    Sacroiliac joint dysfunction of right side  -     Case Request    Anticoagulated      --- Right S1 LTFESI (complete series of three injections as requested by patient and neurosurgery) and right SI joint injection.  Patient must hold coumadin 5 days prior, will try to minimize disruptions in her anticoagulation  --- Could possibly consider SCS therapy in the future, patient is a poor surgical candidate per Dr. Brush and I am unsure if consideration of SCS is an option given history of post operative infections.  Would need to discuss with Dr. Brush and Dr. Baker   --- Follow-up after procedure, she will schedule her follow up visit with Dr. Olivia WREN REPORT  HOLGER report has been reviewed and scanned into the patient's chart.    As the clinician, I personally reviewed the HOLGER from 11/6/2018 while the patient was in the office today.    EMR Dragon/Transcription disclaimer:   Much of this encounter note is an electronic transcription/translation of spoken language to printed text. The electronic translation of spoken language may permit erroneous, or at times, nonsensical words or phrases to be inadvertently transcribed; Although I have reviewed the note for such errors, some may still exist.

## 2018-11-14 NOTE — PROGRESS NOTES
TWO INDEPENDENT PROCEDURES:  LTFESI AND SIJ    -------    Right S1 Lumbar Transforaminal Epidural Steroid Injection  [facility]      PREOPERATIVE DIAGNOSIS:  Lumbar Disc Displacement and right Lumbar Radiculopathy    POSTOPERATIVE DIAGNOSIS:  Same as preop diagnosis    PROCEDURE:  CPT 42118 --  Diagnostic Transforaminal Epidural Steroid Injection at the S1 level, on the RIGHT    PRE-PROCEDURE DISCUSSION WITH PATIENT:    Risks and complications were discussed with the patient prior to starting the procedure and informed consent was obtained.  We discussed various topics including but not limited to bleeding, infection, injury, nerve injury, paralysis, coma, death, postprocedural painful flare-up, postprocedural site soreness, and a lack of pain relief.  We discussed the diagnostic aspect of transforaminal epidural / selective nerve root blockade.    SURGEON:  Flynn Baker MD    REASON FOR PROCEDURE:    Previous diagnostic positivity from injection at same location, Degenerative changes are noted in the area., Radiating pattern of pain is likely consistent with degenerative changes in the area., Because of the history of multiple painful diagnoses/findings, this injection is reasonable and necessary to attempt to distinguish relative contributions of a painful generator., Acute pain flareup is noted & problematic, and the injection is used to attempt to break the flareup.   and Interlaminar approach is relatively contraindicated.      SEDATION:  Versed 6mg & Fentanyl 100 mcg IV  ANESTHETIC:  Marcaine 0.25%  STEROID:  Methylprednisolone (DEPO MEDROL) 40mg/ml    DESCRIPTON OF PROCEDURE:  After obtaining informed consent, an I.V. was started in the preoperative area. The patient taken to the operating room and was placed in the prone position with a pillow under the abdomen.  All pressure points were well padded.  EKG, blood pressure, and pulse oximeter were monitored.  The lumbosacral area was  prepped with Chloraprep and draped in a sterile fashion. Under fluoroscopic guidance the upper vertebral-equivalent level of the sacrum was identified, and in a slightly oblique view, the S1 posterior foramen was identified, and then a point just below the foramen at 6 o'clock position was identified. Skin and subcutaneous tissue was anesthetized with 1% lidocaine. A 22-gauge spinal needle was introduced under fluoroscopic guidance at the above junction and then redirected slightly cephalad and into the foramen without parasthesias and into the epidural space. After confirming the position of the needle with PA, lateral, and oblique fluoroscopic views, aspiration was checked and was clear of blood or CSF.  Next, 1 mL of Omnipaque was injected. After seeing adequate spread on the corresponding nerve root, a total volume 3mL of injectate containing 1ml of the above mentioned local anesthetic, 1 ml saline,  and the above mentioned corticosteroid was injected into the epidural space.    The needle was removed intact.      ESTIMATED BLOOD LOSS:  <5 mL  SPECIMENS:  none    COMPLICATIONS:   No complications were noted., There was no indication of vascular uptake on live injection of contrast dye. and The patient did not have any signs of postprocedure numbness nor weakness.    TOLERANCE & DISCHARGE CONDITION:    The patient tolerated the procedure well.  The patient was transported to the recovery area without difficulties.  The patient was discharged to home under the care of family in stable and satisfactory condition.    PLAN OF CARE:  1. The patient was given our standard instruction sheet.  2. The patient will Return to clinic in 8 wks.  3. The patient will resume all medications as per the medication reconciliation sheet.      -------    RIGHT Sacroiliac Joint Injection  [facility]    PREOPERATIVE DIAGNOSIS:   Sacroiliac joint dysfunction on the RIGHT    POSTOPERATIVE DIAGNOSIS:  Sacroiliac  joint dysfunction on the RIGHT    PROCEDURE:  Sacroiliac Joint Injection, on the RIGHT, with fluoroscopic guidance    PRE-PROCEDURE DISCUSSION WITH PATIENT:    Risks and complications were discussed with the patient prior to starting the procedure and informed consent was obtained.  We discussed various topics including but not limited to bleeding, infection, injury, postprocedural site soreness, painful flareup, worsening of clinical picture, paralysis, coma, and death.     SURGEON:  Flynn Baker MD    REASON FOR PROCEDURE:    Patient has pain consistent with SI pathology on history and physical exam. Patient has other lumbrosacral pathology that makes the injection diagnostically necessary. Positive sacroiliac provocation maneuvers noted.   Of note, she did hold warfarin 5 days prior to procedure.    SEDATION:  as above  ANESTHETIC AGENT:  Marcaine 0.5%  STEROID AGENT:  40mg DepoMedrol    DESCRIPTON OF PROCEDURE:  After obtaining informed consent, IV access was obtained in the preoperative area.  The patient was transported to the operative suite and placed in the prone position with a pillow under the pelvic area. EKG, blood pressure, and pulse oximeter were monitored. The lumbosacral area was prepped with Chloraprep and draped in a sterile fashion.     Under fluoroscopic guidance the inferior most portion of the RIGHT sacroiliac joint was identified. The overlying skin and subcutaneous tissue was anesthetized with 1% lidocaine. A 22-gauge spinal needle was introduced from the inferior most portion of the joint into the sacroiliac joint under fluoroscopic guidance in the AP dimension with slight oblique rotation to the contralateral side.  Aspiration was negative.  After confirming the position of the needle with fluoroscopy, 1 mL of Omnipaque was injected and after seeing appropriate spread into the joint a total of 2.5 mL of Marcaine, with approximately 40 mg of DepoMedrol, was injected very slowly.  Vital  signs remained stable.  The onset of analgesia was noted.    ESTIMATED BLOOD LOSS:  minimal  SPECIMENS:  None    COMPLICATIONS:  No complications were noted., There was no indication of vascular uptake on live injection of contrast dye. and The patient did not have any signs of postprocedure numbness nor weakness.    TOLERANCE & DISCHARGE CONDITION:    The patient tolerated the procedure well.  The patient was transported to the recovery area without difficulties.  The patient was discharged to home under the care of family in stable and satisfactory condition.    PLAN OF CARE:  1. The patient was given our standard instruction sheet and will resume all medications as per the medication reconciliation sheet.  2. The patient will follow up as above.  3. The patient is instructed to keep a pain log hourly for 8 hours after the procedure.

## 2018-12-18 PROBLEM — I10 HTN (HYPERTENSION): Status: ACTIVE | Noted: 2018-01-01

## 2018-12-18 PROBLEM — D64.9 ANEMIA: Status: ACTIVE | Noted: 2018-01-01

## 2018-12-18 PROBLEM — N17.9 AKI (ACUTE KIDNEY INJURY) (HCC): Status: ACTIVE | Noted: 2018-01-01

## 2018-12-18 PROBLEM — N18.30 CKD (CHRONIC KIDNEY DISEASE), STAGE III (HCC): Status: ACTIVE | Noted: 2018-01-01

## 2018-12-18 PROBLEM — M06.9 RA (RHEUMATOID ARTHRITIS) (HCC): Status: ACTIVE | Noted: 2018-01-01

## 2018-12-18 PROBLEM — Z86.711 HISTORY OF PULMONARY EMBOLISM: Status: ACTIVE | Noted: 2018-01-01

## 2018-12-18 PROBLEM — G82.20 PARAPLEGIA (HCC): Status: ACTIVE | Noted: 2018-01-01

## 2018-12-18 PROBLEM — F32.A DEPRESSION: Status: ACTIVE | Noted: 2018-01-01

## 2018-12-18 PROBLEM — K44.9 HIATAL HERNIA: Status: ACTIVE | Noted: 2018-01-01

## 2018-12-18 PROBLEM — Z79.01 ANTICOAGULATED ON COUMADIN: Status: ACTIVE | Noted: 2018-01-01

## 2018-12-19 NOTE — PROGRESS NOTES
Subjective   Patient ID: Saba Bonner is a 73 y.o. female is here today for 4 month follow-up after pain management/ABDULKADIR and with new lumbar MRI as ordered during recent admission to hospital.  Patient was last seen 8.31.18 for low back and right leg pain, right foot numbness. Patient was seen [facility] ER on 12.18.18 and admitted after fall at home while transferring from her wheelchair and has had increased LBP since then.  She is currently at [facility] and is getting PT/OT, but is not pleased with the services. She is currently have moderate to severe LBP and right leg pain, ABDULKADIR worked for a while, her pain returned about 2 weeks ago. She is currently in a wheelchair. She is taking Norco 7.5 mg/325 q 4 hours,    Back Pain   The problem occurs constantly. The pain is at a severity of 7/10. Associated symptoms include leg pain, numbness, tingling and weakness.   Leg Pain    The pain is present in the right leg. The pain is at a severity of 8/10. Associated symptoms include numbness and tingling.       The following portions of the patient's history were reviewed and updated as appropriate: allergies, current medications, past family history, past medical history, past social history, past surgical history and problem list.    Review of Systems   Musculoskeletal: Positive for arthralgias, back pain and gait problem.   Neurological: Positive for tingling, weakness and numbness.   All other systems reviewed and are negative.    She is with her son. Since I saw her last she was admitted to the hospital for falls. She was released and is still at the nursing home, which she is very unhappy with. She went to see Dr. Baker and got 3 transforaminal epidural blocks. The last 1 helped for about 2 weeks. They had talked with her about the possibility of doing a spinal cord stimulator. While that is technically an option, I would think very hard about that because she has so many medical comorbidities that the risk of  complications and making her worse or even death I think are actually quite high, so while it is not absolutely ruled out, I think it would be last on my list of things to consider. Her primary care physician manages her pain medications, which apparently simply are not enough. She is on short-acting medications and I told her to speak to Dr. Baker when she sees him next on 01/07/2019 about taking over prescription of the pain medications and switching over to some longer acting means. Also, she might simply just get transforaminal epidural blocks for the right buttock and leg pain every 3 months, even though they are not helping for a long period of time. I think that certainly is more preferable to any kind of reoperation, which I think in her case would be really out of the question if we are talking about an instrumented lumbar fusion. We have gone that way before and it led to nothing but complications. She was quite tearful today and told me that her life was not worth living. We can give a new prescription for a motorized scooter since her current one is getting broken down. She never got to the vascular surgeon, which I had tried to arrange last time, and so will have her see Dr. Smyth. Will bring her back in 6 months.       Objective   Physical Exam   Constitutional: She is oriented to person, place, and time. She appears well-developed and well-nourished.   HENT:   Head: Normocephalic and atraumatic.   Eyes: Conjunctivae and EOM are normal. Pupils are equal, round, and reactive to light.   Fundoscopic exam:       The right eye shows no papilledema. The right eye shows venous pulsations.        The left eye shows no papilledema. The left eye shows venous pulsations.   Neck: Carotid bruit is not present.   Neurological: She is oriented to person, place, and time. She has a normal Finger-Nose-Finger Test and a normal Heel to Shin Test. Gait normal.   Reflex Scores:       Tricep reflexes are 2+ on the right  side and 2+ on the left side.       Bicep reflexes are 2+ on the right side and 2+ on the left side.       Brachioradialis reflexes are 2+ on the right side and 2+ on the left side.       Patellar reflexes are 2+ on the right side and 2+ on the left side.       Achilles reflexes are 2+ on the right side and 2+ on the left side.  Psychiatric: Her speech is normal.     Neurologic Exam     Mental Status   Oriented to person, place, and time.   Registration of memory: Good recent and remote memory.   Attention: normal. Concentration: normal.   Speech: speech is normal   Level of consciousness: alert  Knowledge: consistent with education.     Cranial Nerves     CN II   Visual fields full to confrontation.   Visual acuity: normal    CN III, IV, VI   Pupils are equal, round, and reactive to light.  Extraocular motions are normal.     CN V   Facial sensation intact.   Right corneal reflex: normal  Left corneal reflex: normal    CN VII   Facial expression full, symmetric.   Right facial weakness: none  Left facial weakness: none    CN VIII   Hearing: intact    CN IX, X   Palate: symmetric    CN XI   Right sternocleidomastoid strength: normal  Left sternocleidomastoid strength: normal    CN XII   Tongue: not atrophic  Tongue deviation: none    Motor Exam   Muscle bulk: normal  Right arm tone: normal  Left arm tone: normal  Right leg tone: normal  Left leg tone: normal    Strength   Strength 5/5 except as noted.     Sensory Exam   Light touch normal.     Gait, Coordination, and Reflexes     Gait  Gait: normal    Coordination   Finger to nose coordination: normal  Heel to shin coordination: normal    Reflexes   Right brachioradialis: 2+  Left brachioradialis: 2+  Right biceps: 2+  Left biceps: 2+  Right triceps: 2+  Left triceps: 2+  Right patellar: 2+  Left patellar: 2+  Right achilles: 2+  Left achilles: 2+  Right : 2+  Left : 2+      Assessment/Plan   Independent Review of Radiographic Studies:    Reviewed the lumbar  MRI and the brain MRI both on on 12/19/18.  There doesn't seem to be anything new in the lumbar MRI such as a new compression fracture or new spinal stenosis.  The brain MRI shows some microvascular disease but nothing terribly revealing.      Medical Decision Making:    Again, I don't really think I much to offer other than to make suggestions in terms of her pain management.  She has an appointment with Dr. Baker on 1/70/18.  I think her options from that standpoint are long-term chronic long acting narcotic medications which can be given by pain management or intermittent use of the transforaminal block, every 3 months since Medicare only reimbursed is for 4 per year.  We'll give her prescription for a new motorized scooter.  We'll send her to vascular surgery to look at her venous stasis issues.  I'll continue to see her but we'll stretch it out to 6 months.      Saba was seen today for back pain and leg pain.    Diagnoses and all orders for this visit:    Spinal stenosis of lumbar region with neurogenic claudication    Herniation of lumbar intervertebral disc with radiculopathy    Venous stasis  -     Ambulatory Referral to Vascular Surgery  -     Ambulatory Referral to Vascular Surgery      Return in about 6 months (around 6/28/2019).

## 2018-12-22 NOTE — PROGRESS NOTES
Received inbound call from staff RN on 4S who states pt Saba Bonner has called the unit to talk with CCP about her transfer from the hospital to SNF at [facility] yesterday. Pt left her contact number [phone]. Called and spoke with pt and allowed her to voice her feelings/concerns. Pt would like to see if she could transfer from [facility] to [facility]. Per CCP notes when pt was hospitalized, a referral was sent to [facility]. Called and spoke with Darrel on pt's behalf and she states pt has called the facility and she has contacted her director Melida Coon to follow up with this pt and assist her with transfer from [facility] to [facility] if possible..........TOMAS Bonilla RN

## 2018-12-28 NOTE — TELEPHONE ENCOUNTER
Spoke with patient and let her know the appointment with the vascular doctor is on 1-24-19 @ 1230 pm and the arrival time for the appointment is 12 pm.  I also gave the patient the office number.

## 2019-01-01 ENCOUNTER — ANESTHESIA EVENT (OUTPATIENT)
Dept: CARDIOLOGY | Facility: HOSPITAL | Age: 74
End: 2019-01-01

## 2019-01-01 ENCOUNTER — APPOINTMENT (OUTPATIENT)
Dept: CARDIOLOGY | Facility: HOSPITAL | Age: 74
End: 2019-01-01

## 2019-01-01 ENCOUNTER — OFFICE VISIT (OUTPATIENT)
Dept: PAIN MEDICINE | Facility: CLINIC | Age: 74
End: 2019-01-01

## 2019-01-01 ENCOUNTER — APPOINTMENT (OUTPATIENT)
Dept: ULTRASOUND IMAGING | Facility: HOSPITAL | Age: 74
End: 2019-01-01

## 2019-01-01 ENCOUNTER — ANESTHESIA (OUTPATIENT)
Dept: CARDIOLOGY | Facility: HOSPITAL | Age: 74
End: 2019-01-01

## 2019-01-01 ENCOUNTER — OFFICE VISIT (OUTPATIENT)
Dept: FAMILY MEDICINE CLINIC | Facility: CLINIC | Age: 74
End: 2019-01-01

## 2019-01-01 ENCOUNTER — HOSPITAL ENCOUNTER (INPATIENT)
Facility: HOSPITAL | Age: 74
LOS: 5 days | End: 2019-03-05
Attending: EMERGENCY MEDICINE | Admitting: INTERNAL MEDICINE

## 2019-01-01 ENCOUNTER — APPOINTMENT (OUTPATIENT)
Dept: GENERAL RADIOLOGY | Facility: HOSPITAL | Age: 74
End: 2019-01-01

## 2019-01-01 VITALS
SYSTOLIC BLOOD PRESSURE: 104 MMHG | HEIGHT: 60 IN | RESPIRATION RATE: 16 BRPM | DIASTOLIC BLOOD PRESSURE: 52 MMHG | OXYGEN SATURATION: 95 % | HEART RATE: 72 BPM | BODY MASS INDEX: 37.11 KG/M2

## 2019-01-01 VITALS
RESPIRATION RATE: 16 BRPM | OXYGEN SATURATION: 97 % | HEIGHT: 62 IN | TEMPERATURE: 97.6 F | DIASTOLIC BLOOD PRESSURE: 67 MMHG | WEIGHT: 179.2 LBS | SYSTOLIC BLOOD PRESSURE: 130 MMHG | BODY MASS INDEX: 32.97 KG/M2 | HEART RATE: 80 BPM

## 2019-01-01 VITALS
BODY MASS INDEX: 37.3 KG/M2 | WEIGHT: 190 LBS | HEIGHT: 60 IN | DIASTOLIC BLOOD PRESSURE: 71 MMHG | HEART RATE: 75 BPM | TEMPERATURE: 98.1 F | RESPIRATION RATE: 16 BRPM | OXYGEN SATURATION: 95 % | SYSTOLIC BLOOD PRESSURE: 142 MMHG

## 2019-01-01 DIAGNOSIS — N93.9 VAGINAL BLEEDING: Primary | ICD-10-CM

## 2019-01-01 DIAGNOSIS — M06.9 RHEUMATOID ARTHRITIS, INVOLVING UNSPECIFIED SITE, UNSPECIFIED RHEUMATOID FACTOR PRESENCE: ICD-10-CM

## 2019-01-01 DIAGNOSIS — D63.1 ANEMIA DUE TO CHRONIC KIDNEY DISEASE, UNSPECIFIED CKD STAGE: ICD-10-CM

## 2019-01-01 DIAGNOSIS — E03.9 HYPOTHYROIDISM, UNSPECIFIED TYPE: ICD-10-CM

## 2019-01-01 DIAGNOSIS — N18.9 ANEMIA DUE TO CHRONIC KIDNEY DISEASE, UNSPECIFIED CKD STAGE: ICD-10-CM

## 2019-01-01 DIAGNOSIS — N18.30 CKD (CHRONIC KIDNEY DISEASE), STAGE III (HCC): ICD-10-CM

## 2019-01-01 DIAGNOSIS — I87.8 VENOUS STASIS: Primary | ICD-10-CM

## 2019-01-01 DIAGNOSIS — Z79.01 ANTICOAGULATED ON COUMADIN: ICD-10-CM

## 2019-01-01 DIAGNOSIS — I10 ESSENTIAL HYPERTENSION: ICD-10-CM

## 2019-01-01 DIAGNOSIS — I44.30 AV HEART BLOCK: ICD-10-CM

## 2019-01-01 DIAGNOSIS — G89.4 CHRONIC PAIN SYNDROME: ICD-10-CM

## 2019-01-01 DIAGNOSIS — E78.00 HYPERCHOLESTEROLEMIA: ICD-10-CM

## 2019-01-01 DIAGNOSIS — D64.9 ANEMIA, UNSPECIFIED TYPE: ICD-10-CM

## 2019-01-01 DIAGNOSIS — M54.16 LUMBAR RADICULOPATHY: ICD-10-CM

## 2019-01-01 DIAGNOSIS — I44.2 THIRD DEGREE AV BLOCK (HCC): ICD-10-CM

## 2019-01-01 DIAGNOSIS — R73.9 HYPERGLYCEMIA: ICD-10-CM

## 2019-01-01 DIAGNOSIS — M48.062 SPINAL STENOSIS OF LUMBAR REGION WITH NEUROGENIC CLAUDICATION: Primary | ICD-10-CM

## 2019-01-01 LAB
ABO + RH BLD: NORMAL
ABO GROUP BLD: NORMAL
ALBUMIN SERPL-MCNC: 2.2 G/DL (ref 3.5–5.2)
ALBUMIN SERPL-MCNC: 3.7 G/DL (ref 3.5–5.2)
ALBUMIN SERPL-MCNC: 3.9 G/DL (ref 3.5–5.2)
ALBUMIN/GLOB SERPL: 1.2 G/DL
ALBUMIN/GLOB SERPL: 1.4 G/DL
ALBUMIN/GLOB SERPL: 1.6 G/DL
ALP SERPL-CCNC: 115 U/L (ref 39–117)
ALP SERPL-CCNC: 82 U/L (ref 39–117)
ALP SERPL-CCNC: 95 U/L (ref 39–117)
ALT SERPL W P-5'-P-CCNC: 110 U/L (ref 1–33)
ALT SERPL W P-5'-P-CCNC: 14 U/L (ref 1–33)
ALT SERPL-CCNC: 24 U/L (ref 1–33)
ANION GAP SERPL CALCULATED.3IONS-SCNC: 13.2 MMOL/L
ANION GAP SERPL CALCULATED.3IONS-SCNC: 13.3 MMOL/L
ANION GAP SERPL CALCULATED.3IONS-SCNC: 13.6 MMOL/L
ANION GAP SERPL CALCULATED.3IONS-SCNC: 14.4 MMOL/L
ANION GAP SERPL CALCULATED.3IONS-SCNC: 14.6 MMOL/L
ANION GAP SERPL CALCULATED.3IONS-SCNC: 16.1 MMOL/L
ANION GAP SERPL CALCULATED.3IONS-SCNC: 19.8 MMOL/L
ANISOCYTOSIS BLD QL: ABNORMAL
AST SERPL-CCNC: 108 U/L (ref 1–32)
AST SERPL-CCNC: 11 U/L (ref 1–32)
AST SERPL-CCNC: 19 U/L (ref 1–32)
BACTERIA UR QL AUTO: ABNORMAL /HPF
BASOPHILS # BLD AUTO: 0.04 10*3/MM3 (ref 0–0.2)
BASOPHILS # BLD AUTO: 0.05 10*3/MM3 (ref 0–0.2)
BASOPHILS # BLD AUTO: 0.05 10*3/MM3 (ref 0–0.2)
BASOPHILS # BLD AUTO: 0.06 10*3/MM3 (ref 0–0.2)
BASOPHILS # BLD AUTO: 0.09 10*3/MM3 (ref 0–0.2)
BASOPHILS # BLD MANUAL: 0.58 10*3/MM3 (ref 0–0.2)
BASOPHILS NFR BLD AUTO: 0.3 % (ref 0–1.5)
BASOPHILS NFR BLD AUTO: 0.4 % (ref 0–1.5)
BASOPHILS NFR BLD AUTO: 0.5 % (ref 0–1.5)
BASOPHILS NFR BLD AUTO: 0.8 % (ref 0–1.5)
BASOPHILS NFR BLD AUTO: 2.1 % (ref 0–1.5)
BH BB BLOOD EXPIRATION DATE: NORMAL
BH BB BLOOD TYPE BARCODE: 5100
BH BB DISPENSE STATUS: NORMAL
BH BB PRODUCT CODE: NORMAL
BH BB UNIT NUMBER: NORMAL
BH CV ECHO MEAS - ACS: 1.5 CM
BH CV ECHO MEAS - AO MAX PG (FULL): 7.8 MMHG
BH CV ECHO MEAS - AO MAX PG: 13.8 MMHG
BH CV ECHO MEAS - AO MEAN PG (FULL): 5 MMHG
BH CV ECHO MEAS - AO MEAN PG: 8 MMHG
BH CV ECHO MEAS - AO ROOT AREA (BSA CORRECTED): 1.6
BH CV ECHO MEAS - AO ROOT AREA: 6.6 CM^2
BH CV ECHO MEAS - AO ROOT DIAM: 2.9 CM
BH CV ECHO MEAS - AO V2 MAX: 186 CM/SEC
BH CV ECHO MEAS - AO V2 MEAN: 130 CM/SEC
BH CV ECHO MEAS - AO V2 VTI: 46.5 CM
BH CV ECHO MEAS - ASC AORTA: 2.7 CM
BH CV ECHO MEAS - AVA PLANIMETRY TRACED: 0.6 CM2
BH CV ECHO MEAS - AVA(I,A): 2 CM^2
BH CV ECHO MEAS - AVA(I,D): 2 CM^2
BH CV ECHO MEAS - AVA(V,A): 2.1 CM^2
BH CV ECHO MEAS - AVA(V,D): 2.1 CM^2
BH CV ECHO MEAS - BSA(HAYCOCK): 1.9 M^2
BH CV ECHO MEAS - BSA(HAYCOCK): 1.9 M^2
BH CV ECHO MEAS - BSA: 1.8 M^2
BH CV ECHO MEAS - BSA: 1.8 M^2
BH CV ECHO MEAS - BZI_BMI: 32.7 KILOGRAMS/M^2
BH CV ECHO MEAS - BZI_BMI: 32.7 KILOGRAMS/M^2
BH CV ECHO MEAS - BZI_METRIC_HEIGHT: 157.5 CM
BH CV ECHO MEAS - BZI_METRIC_HEIGHT: 157.5 CM
BH CV ECHO MEAS - BZI_METRIC_WEIGHT: 81.2 KG
BH CV ECHO MEAS - BZI_METRIC_WEIGHT: 81.2 KG
BH CV ECHO MEAS - EDV(CUBED): 157.5 ML
BH CV ECHO MEAS - EDV(MOD-SP2): 130 ML
BH CV ECHO MEAS - EDV(MOD-SP4): 159 ML
BH CV ECHO MEAS - EDV(MOD-SP4): 59 ML
BH CV ECHO MEAS - EDV(TEICH): 141.3 ML
BH CV ECHO MEAS - EF(CUBED): 70.4 %
BH CV ECHO MEAS - EF(MOD-BP): 19 %
BH CV ECHO MEAS - EF(MOD-BP): 47 %
BH CV ECHO MEAS - EF(MOD-SP2): 48.5 %
BH CV ECHO MEAS - EF(MOD-SP4): 18.6 %
BH CV ECHO MEAS - EF(MOD-SP4): 49.7 %
BH CV ECHO MEAS - EF(TEICH): 61.5 %
BH CV ECHO MEAS - ESV(CUBED): 46.7 ML
BH CV ECHO MEAS - ESV(MOD-SP2): 67 ML
BH CV ECHO MEAS - ESV(MOD-SP4): 48 ML
BH CV ECHO MEAS - ESV(MOD-SP4): 80 ML
BH CV ECHO MEAS - ESV(TEICH): 54.4 ML
BH CV ECHO MEAS - FS: 33.3 %
BH CV ECHO MEAS - IVS/LVPW: 1
BH CV ECHO MEAS - IVSD: 1.3 CM
BH CV ECHO MEAS - LAT PEAK E' VEL: 7 CM/SEC
BH CV ECHO MEAS - LV DIASTOLIC VOL/BSA (35-75): 32.4 ML/M^2
BH CV ECHO MEAS - LV DIASTOLIC VOL/BSA (35-75): 87.2 ML/M^2
BH CV ECHO MEAS - LV MASS(C)D: 295.6 GRAMS
BH CV ECHO MEAS - LV MASS(C)DI: 162.1 GRAMS/M^2
BH CV ECHO MEAS - LV MAX PG: 6.1 MMHG
BH CV ECHO MEAS - LV MEAN PG: 3 MMHG
BH CV ECHO MEAS - LV SYSTOLIC VOL/BSA (12-30): 26.3 ML/M^2
BH CV ECHO MEAS - LV SYSTOLIC VOL/BSA (12-30): 43.9 ML/M^2
BH CV ECHO MEAS - LV V1 MAX: 123 CM/SEC
BH CV ECHO MEAS - LV V1 MEAN: 83.4 CM/SEC
BH CV ECHO MEAS - LV V1 VTI: 29.3 CM
BH CV ECHO MEAS - LVIDD: 5.4 CM
BH CV ECHO MEAS - LVIDS: 3.6 CM
BH CV ECHO MEAS - LVLD AP2: 8.9 CM
BH CV ECHO MEAS - LVLD AP4: 7 CM
BH CV ECHO MEAS - LVLD AP4: 9.7 CM
BH CV ECHO MEAS - LVLS AP2: 7.5 CM
BH CV ECHO MEAS - LVLS AP4: 6.8 CM
BH CV ECHO MEAS - LVLS AP4: 9 CM
BH CV ECHO MEAS - LVOT AREA (M): 3.1 CM^2
BH CV ECHO MEAS - LVOT AREA: 3.1 CM^2
BH CV ECHO MEAS - LVOT DIAM: 2 CM
BH CV ECHO MEAS - LVPWD: 1.3 CM
BH CV ECHO MEAS - MED PEAK E' VEL: 11 CM/SEC
BH CV ECHO MEAS - MV A DUR: 0.14 SEC
BH CV ECHO MEAS - MV A MAX VEL: 169 CM/SEC
BH CV ECHO MEAS - MV DEC SLOPE: 621.5 CM/SEC^2
BH CV ECHO MEAS - MV DEC TIME: 232 SEC
BH CV ECHO MEAS - MV E MAX VEL: 142 CM/SEC
BH CV ECHO MEAS - MV E/A: 0.84
BH CV ECHO MEAS - MV MAX PG: 11.1 MMHG
BH CV ECHO MEAS - MV MEAN PG: 6 MMHG
BH CV ECHO MEAS - MV P1/2T MAX VEL: 155.5 CM/SEC
BH CV ECHO MEAS - MV P1/2T: 73.3 MSEC
BH CV ECHO MEAS - MV V2 MAX: 166.5 CM/SEC
BH CV ECHO MEAS - MV V2 MEAN: 109 CM/SEC
BH CV ECHO MEAS - MV V2 VTI: 49 CM
BH CV ECHO MEAS - MVA P1/2T LCG: 1.4 CM^2
BH CV ECHO MEAS - MVA(P1/2T): 3 CM^2
BH CV ECHO MEAS - MVA(VTI): 1.9 CM^2
BH CV ECHO MEAS - PA ACC TIME: 0.09 SEC
BH CV ECHO MEAS - PA MAX PG (FULL): 2.8 MMHG
BH CV ECHO MEAS - PA MAX PG: 4.4 MMHG
BH CV ECHO MEAS - PA PR(ACCEL): 37.6 MMHG
BH CV ECHO MEAS - PA V2 MAX: 105 CM/SEC
BH CV ECHO MEAS - RAP SYSTOLE: 8 MMHG
BH CV ECHO MEAS - RV MAX PG: 1.6 MMHG
BH CV ECHO MEAS - RV MEAN PG: 1 MMHG
BH CV ECHO MEAS - RV V1 MAX: 63.5 CM/SEC
BH CV ECHO MEAS - RV V1 MEAN: 45.9 CM/SEC
BH CV ECHO MEAS - RV V1 VTI: 15.7 CM
BH CV ECHO MEAS - RVSP: 37 MMHG
BH CV ECHO MEAS - SI(AO): 168.4 ML/M^2
BH CV ECHO MEAS - SI(CUBED): 60.8 ML/M^2
BH CV ECHO MEAS - SI(LVOT): 50.5 ML/M^2
BH CV ECHO MEAS - SI(MOD-SP2): 34.5 ML/M^2
BH CV ECHO MEAS - SI(MOD-SP4): 43.3 ML/M^2
BH CV ECHO MEAS - SI(MOD-SP4): 6 ML/M^2
BH CV ECHO MEAS - SI(TEICH): 47.6 ML/M^2
BH CV ECHO MEAS - SV(AO): 307.1 ML
BH CV ECHO MEAS - SV(CUBED): 110.8 ML
BH CV ECHO MEAS - SV(LVOT): 92 ML
BH CV ECHO MEAS - SV(MOD-SP2): 63 ML
BH CV ECHO MEAS - SV(MOD-SP4): 11 ML
BH CV ECHO MEAS - SV(MOD-SP4): 79 ML
BH CV ECHO MEAS - SV(TEICH): 86.9 ML
BH CV ECHO MEAS - TAPSE (>1.6): 1.9 CM2
BH CV ECHO MEAS - TR MAX VEL: 269 CM/SEC
BH CV ECHO MEASUREMENTS AVERAGE E/E' RATIO: 15.78
BH CV XLRA - RV BASE: 3.3 CM
BH CV XLRA - TDI S': 8.2 CM/SEC
BILIRUB SERPL-MCNC: 0.2 MG/DL (ref 0.1–1.2)
BILIRUB SERPL-MCNC: 0.2 MG/DL (ref 0.1–1.2)
BILIRUB SERPL-MCNC: 0.4 MG/DL (ref 0.1–1.2)
BILIRUB UR QL STRIP: NEGATIVE
BLD GP AB SCN SERPL QL: NEGATIVE
BUN BLD-MCNC: 17 MG/DL (ref 8–23)
BUN BLD-MCNC: 17 MG/DL (ref 8–23)
BUN BLD-MCNC: 18 MG/DL (ref 8–23)
BUN BLD-MCNC: 19 MG/DL (ref 8–23)
BUN BLD-MCNC: 21 MG/DL (ref 8–23)
BUN BLD-MCNC: 22 MG/DL (ref 8–23)
BUN BLD-MCNC: 23 MG/DL (ref 8–23)
BUN SERPL-MCNC: 24 MG/DL (ref 8–23)
BUN/CREAT SERPL: 12.3 (ref 7–25)
BUN/CREAT SERPL: 14.7 (ref 7–25)
BUN/CREAT SERPL: 15.7 (ref 7–25)
BUN/CREAT SERPL: 17.1 (ref 7–25)
BUN/CREAT SERPL: 18.6 (ref 7–25)
BUN/CREAT SERPL: 18.9 (ref 7–25)
BUN/CREAT SERPL: 19.2 (ref 7–25)
BUN/CREAT SERPL: 19.6 (ref 7–25)
CALCIUM SERPL-MCNC: 9.6 MG/DL (ref 8.6–10.5)
CALCIUM SPEC-SCNC: 7.2 MG/DL (ref 8.6–10.5)
CALCIUM SPEC-SCNC: 9.3 MG/DL (ref 8.6–10.5)
CALCIUM SPEC-SCNC: 9.4 MG/DL (ref 8.6–10.5)
CALCIUM SPEC-SCNC: 9.5 MG/DL (ref 8.6–10.5)
CALCIUM SPEC-SCNC: 9.5 MG/DL (ref 8.6–10.5)
CHLORIDE SERPL-SCNC: 101 MMOL/L (ref 98–107)
CHLORIDE SERPL-SCNC: 102 MMOL/L (ref 98–107)
CHLORIDE SERPL-SCNC: 102 MMOL/L (ref 98–107)
CHLORIDE SERPL-SCNC: 107 MMOL/L (ref 98–107)
CHLORIDE SERPL-SCNC: 99 MMOL/L (ref 98–107)
CHLORIDE SERPL-SCNC: 99 MMOL/L (ref 98–107)
CHOLEST SERPL-MCNC: 202 MG/DL (ref 0–200)
CLARITY UR: CLEAR
CO2 SERPL-SCNC: 21.2 MMOL/L (ref 22–29)
CO2 SERPL-SCNC: 23.4 MMOL/L (ref 22–29)
CO2 SERPL-SCNC: 23.9 MMOL/L (ref 22–29)
CO2 SERPL-SCNC: 24.1 MMOL/L (ref 22–29)
CO2 SERPL-SCNC: 24.6 MMOL/L (ref 22–29)
CO2 SERPL-SCNC: 24.8 MMOL/L (ref 22–29)
CO2 SERPL-SCNC: 25.7 MMOL/L (ref 22–29)
CO2 SERPL-SCNC: 26.4 MMOL/L (ref 22–29)
COLOR UR: YELLOW
CREAT BLD-MCNC: 1.07 MG/DL (ref 0.57–1)
CREAT BLD-MCNC: 1.11 MG/DL (ref 0.57–1)
CREAT BLD-MCNC: 1.15 MG/DL (ref 0.57–1)
CREAT BLD-MCNC: 1.16 MG/DL (ref 0.57–1)
CREAT BLD-MCNC: 1.18 MG/DL (ref 0.57–1)
CREAT BLD-MCNC: 1.2 MG/DL (ref 0.57–1)
CREAT BLD-MCNC: 1.38 MG/DL (ref 0.57–1)
CREAT SERPL-MCNC: 1.27 MG/DL (ref 0.57–1)
DEPRECATED RDW RBC AUTO: 48.4 FL (ref 37–54)
DEPRECATED RDW RBC AUTO: 49.9 FL (ref 37–54)
DEPRECATED RDW RBC AUTO: 50.2 FL (ref 37–54)
DEPRECATED RDW RBC AUTO: 50.2 FL (ref 37–54)
DEPRECATED RDW RBC AUTO: 51 FL (ref 37–54)
DEPRECATED RDW RBC AUTO: 51.2 FL (ref 37–54)
DEPRECATED RDW RBC AUTO: 53 FL (ref 37–54)
EOSINOPHIL # BLD AUTO: 0.05 10*3/MM3 (ref 0–0.4)
EOSINOPHIL # BLD AUTO: 0.1 10*3/MM3 (ref 0–0.4)
EOSINOPHIL # BLD AUTO: 0.14 10*3/MM3 (ref 0–0.4)
EOSINOPHIL # BLD AUTO: 0.18 10*3/MM3 (ref 0–0.4)
EOSINOPHIL # BLD AUTO: 0.23 10*3/MM3 (ref 0–0.4)
EOSINOPHIL # BLD AUTO: 0.24 10*3/MM3 (ref 0–0.7)
EOSINOPHIL # BLD AUTO: 0.27 10*3/MM3 (ref 0–0.4)
EOSINOPHIL NFR BLD AUTO: 0.5 % (ref 0.3–6.2)
EOSINOPHIL NFR BLD AUTO: 0.8 % (ref 0.3–6.2)
EOSINOPHIL NFR BLD AUTO: 1.1 % (ref 0.3–6.2)
EOSINOPHIL NFR BLD AUTO: 1.6 % (ref 0.3–6.2)
EOSINOPHIL NFR BLD AUTO: 2 % (ref 0.3–6.2)
EOSINOPHIL NFR BLD AUTO: 2 % (ref 0.3–6.2)
EOSINOPHIL NFR BLD AUTO: 2.2 % (ref 0.3–6.2)
ERYTHROCYTE [DISTWIDTH] IN BLOOD BY AUTOMATED COUNT: 15 % (ref 12.3–15.4)
ERYTHROCYTE [DISTWIDTH] IN BLOOD BY AUTOMATED COUNT: 15.2 % (ref 12.3–15.4)
ERYTHROCYTE [DISTWIDTH] IN BLOOD BY AUTOMATED COUNT: 15.2 % (ref 12.3–15.4)
ERYTHROCYTE [DISTWIDTH] IN BLOOD BY AUTOMATED COUNT: 15.3 % (ref 12.3–15.4)
ERYTHROCYTE [DISTWIDTH] IN BLOOD BY AUTOMATED COUNT: 15.5 % (ref 12.3–15.4)
ERYTHROCYTE [DISTWIDTH] IN BLOOD BY AUTOMATED COUNT: 16.5 % (ref 11.7–13)
FERRITIN SERPL-MCNC: 63.2 NG/ML (ref 13–150)
GFR SERPL CREATININE-BSD FRML MDRD: 38 ML/MIN/1.73
GFR SERPL CREATININE-BSD FRML MDRD: 44 ML/MIN/1.73
GFR SERPL CREATININE-BSD FRML MDRD: 45 ML/MIN/1.73
GFR SERPL CREATININE-BSD FRML MDRD: 46 ML/MIN/1.73
GFR SERPL CREATININE-BSD FRML MDRD: 46 ML/MIN/1.73
GFR SERPL CREATININE-BSD FRML MDRD: 48 ML/MIN/1.73
GFR SERPL CREATININE-BSD FRML MDRD: 50 ML/MIN/1.73
GLOBULIN SER CALC-MCNC: 2.7 GM/DL
GLOBULIN UR ELPH-MCNC: 1.8 GM/DL
GLOBULIN UR ELPH-MCNC: 2.3 GM/DL
GLUCOSE BLD-MCNC: 122 MG/DL (ref 65–99)
GLUCOSE BLD-MCNC: 122 MG/DL (ref 65–99)
GLUCOSE BLD-MCNC: 129 MG/DL (ref 65–99)
GLUCOSE BLD-MCNC: 149 MG/DL (ref 65–99)
GLUCOSE BLD-MCNC: 222 MG/DL (ref 65–99)
GLUCOSE BLD-MCNC: 415 MG/DL (ref 65–99)
GLUCOSE BLD-MCNC: 99 MG/DL (ref 65–99)
GLUCOSE BLDC GLUCOMTR-MCNC: 275 MG/DL (ref 70–130)
GLUCOSE SERPL-MCNC: 137 MG/DL (ref 65–99)
GLUCOSE UR STRIP-MCNC: NEGATIVE MG/DL
HBA1C MFR BLD: 5.98 % (ref 4.8–5.6)
HCT VFR BLD AUTO: 28.3 % (ref 34–46.6)
HCT VFR BLD AUTO: 29.5 % (ref 34–46.6)
HCT VFR BLD AUTO: 30.5 % (ref 35.6–45.5)
HCT VFR BLD AUTO: 31.5 % (ref 34–46.6)
HCT VFR BLD AUTO: 32.5 % (ref 34–46.6)
HCT VFR BLD AUTO: 33.9 % (ref 34–46.6)
HCT VFR BLD AUTO: 34.2 % (ref 34–46.6)
HCT VFR BLD AUTO: 34.4 % (ref 34–46.6)
HCT VFR BLD AUTO: 34.5 % (ref 34–46.6)
HCT VFR BLD AUTO: 36.3 % (ref 34–46.6)
HDLC SERPL-MCNC: 46 MG/DL (ref 40–60)
HGB BLD-MCNC: 10 G/DL (ref 12–15.9)
HGB BLD-MCNC: 10 G/DL (ref 12–15.9)
HGB BLD-MCNC: 10.2 G/DL (ref 12–15.9)
HGB BLD-MCNC: 10.3 G/DL (ref 12–15.9)
HGB BLD-MCNC: 10.7 G/DL (ref 12–15.9)
HGB BLD-MCNC: 8.1 G/DL (ref 12–15.9)
HGB BLD-MCNC: 8.4 G/DL (ref 12–15.9)
HGB BLD-MCNC: 9.2 G/DL (ref 12–15.9)
HGB BLD-MCNC: 9.3 G/DL (ref 11.9–15.5)
HGB BLD-MCNC: 9.6 G/DL (ref 12–15.9)
HGB UR QL STRIP.AUTO: ABNORMAL
HYALINE CASTS UR QL AUTO: ABNORMAL /LPF
IMM GRANULOCYTES # BLD AUTO: 0.1 10*3/MM3 (ref 0–0.05)
IMM GRANULOCYTES # BLD AUTO: 0.1 10*3/MM3 (ref 0–0.05)
IMM GRANULOCYTES # BLD AUTO: 0.11 10*3/MM3 (ref 0–0.05)
IMM GRANULOCYTES # BLD AUTO: 0.14 10*3/MM3 (ref 0–0.05)
IMM GRANULOCYTES # BLD AUTO: 0.15 10*3/MM3 (ref 0–0.05)
IMM GRANULOCYTES # BLD AUTO: 0.16 10*3/MM3 (ref 0–0.03)
IMM GRANULOCYTES # BLD AUTO: 0.16 10*3/MM3 (ref 0–0.05)
IMM GRANULOCYTES NFR BLD AUTO: 0.9 % (ref 0–0.5)
IMM GRANULOCYTES NFR BLD AUTO: 1.1 % (ref 0–0.5)
IMM GRANULOCYTES NFR BLD AUTO: 1.2 % (ref 0–0.5)
IMM GRANULOCYTES NFR BLD AUTO: 1.3 % (ref 0–0.5)
IMM GRANULOCYTES NFR BLD AUTO: 1.3 % (ref 0–0.5)
INR PPP: 1.39 (ref 0.9–1.1)
IRON 24H UR-MRATE: 21 MCG/DL (ref 37–145)
IRON SATN MFR SERPL: 6 % (ref 20–50)
KETONES UR QL STRIP: NEGATIVE
LDLC SERPL CALC-MCNC: 82 MG/DL (ref 0–100)
LDLC/HDLC SERPL: 1.78 {RATIO}
LEFT ATRIUM VOLUME INDEX: 29 ML/M2
LEUKOCYTE ESTERASE UR QL STRIP.AUTO: ABNORMAL
LYMPHOCYTES # BLD AUTO: 1.66 10*3/MM3 (ref 0.7–3.1)
LYMPHOCYTES # BLD AUTO: 1.79 10*3/MM3 (ref 0.9–4.8)
LYMPHOCYTES # BLD AUTO: 2.06 10*3/MM3 (ref 0.7–3.1)
LYMPHOCYTES # BLD AUTO: 2.59 10*3/MM3 (ref 0.7–3.1)
LYMPHOCYTES # BLD AUTO: 2.59 10*3/MM3 (ref 0.7–3.1)
LYMPHOCYTES # BLD AUTO: 2.77 10*3/MM3 (ref 0.7–3.1)
LYMPHOCYTES # BLD AUTO: 3.07 10*3/MM3 (ref 0.7–3.1)
LYMPHOCYTES # BLD MANUAL: 8.18 10*3/MM3 (ref 0.7–3.1)
LYMPHOCYTES NFR BLD AUTO: 14.7 % (ref 19.6–45.3)
LYMPHOCYTES NFR BLD AUTO: 15 % (ref 19.6–45.3)
LYMPHOCYTES NFR BLD AUTO: 19.2 % (ref 19.6–45.3)
LYMPHOCYTES NFR BLD AUTO: 21 % (ref 19.6–45.3)
LYMPHOCYTES NFR BLD AUTO: 21.4 % (ref 19.6–45.3)
LYMPHOCYTES NFR BLD AUTO: 22.2 % (ref 19.6–45.3)
LYMPHOCYTES NFR BLD AUTO: 25.3 % (ref 19.6–45.3)
LYMPHOCYTES NFR BLD MANUAL: 29.5 % (ref 19.6–45.3)
MAXIMAL PREDICTED HEART RATE: 148 BPM
MAXIMAL PREDICTED HEART RATE: 148 BPM
MCH RBC QN AUTO: 26.1 PG (ref 26.6–33)
MCH RBC QN AUTO: 26.3 PG (ref 26.6–33)
MCH RBC QN AUTO: 26.6 PG (ref 26.6–33)
MCH RBC QN AUTO: 26.6 PG (ref 26.6–33)
MCH RBC QN AUTO: 26.8 PG (ref 26.6–33)
MCH RBC QN AUTO: 27 PG (ref 26.6–33)
MCH RBC QN AUTO: 27.1 PG (ref 26.6–33)
MCH RBC QN AUTO: 28.8 PG (ref 26.9–32)
MCHC RBC AUTO-ENTMCNC: 28.5 G/DL (ref 31.5–35.7)
MCHC RBC AUTO-ENTMCNC: 28.6 G/DL (ref 31.5–35.7)
MCHC RBC AUTO-ENTMCNC: 29.2 G/DL (ref 31.5–35.7)
MCHC RBC AUTO-ENTMCNC: 29.2 G/DL (ref 31.5–35.7)
MCHC RBC AUTO-ENTMCNC: 29.5 G/DL (ref 31.5–35.7)
MCHC RBC AUTO-ENTMCNC: 29.5 G/DL (ref 31.5–35.7)
MCHC RBC AUTO-ENTMCNC: 30.4 G/DL (ref 31.5–35.7)
MCHC RBC AUTO-ENTMCNC: 30.5 G/DL (ref 32.4–36.3)
MCV RBC AUTO: 87.6 FL (ref 79–97)
MCV RBC AUTO: 90 FL (ref 79–97)
MCV RBC AUTO: 90 FL (ref 79–97)
MCV RBC AUTO: 91.3 FL (ref 79–97)
MCV RBC AUTO: 91.7 FL (ref 79–97)
MCV RBC AUTO: 91.8 FL (ref 79–97)
MCV RBC AUTO: 94.4 FL (ref 80.5–98.2)
MCV RBC AUTO: 95.2 FL (ref 79–97)
MICROCYTES BLD QL: ABNORMAL
MONOCYTES # BLD AUTO: 0.64 10*3/MM3 (ref 0.1–0.9)
MONOCYTES # BLD AUTO: 0.64 10*3/MM3 (ref 0.2–1.2)
MONOCYTES # BLD AUTO: 0.82 10*3/MM3 (ref 0.1–0.9)
MONOCYTES # BLD AUTO: 0.87 10*3/MM3 (ref 0.1–0.9)
MONOCYTES # BLD AUTO: 0.97 10*3/MM3 (ref 0.1–0.9)
MONOCYTES # BLD AUTO: 1.13 10*3/MM3 (ref 0.1–0.9)
MONOCYTES # BLD AUTO: 1.29 10*3/MM3 (ref 0.1–0.9)
MONOCYTES NFR BLD AUTO: 10 % (ref 5–12)
MONOCYTES NFR BLD AUTO: 5.3 % (ref 5–12)
MONOCYTES NFR BLD AUTO: 6 % (ref 5–12)
MONOCYTES NFR BLD AUTO: 6.6 % (ref 5–12)
MONOCYTES NFR BLD AUTO: 7.9 % (ref 5–12)
MONOCYTES NFR BLD AUTO: 8.3 % (ref 5–12)
MONOCYTES NFR BLD AUTO: 9.3 % (ref 5–12)
NEUTROPHILS # BLD AUTO: 18.96 10*3/MM3 (ref 1.4–7)
NEUTROPHILS # BLD AUTO: 7.45 10*3/MM3 (ref 1.4–7)
NEUTROPHILS # BLD AUTO: 7.66 10*3/MM3 (ref 1.4–7)
NEUTROPHILS # BLD AUTO: 7.84 10*3/MM3 (ref 1.4–7)
NEUTROPHILS # BLD AUTO: 8.19 10*3/MM3 (ref 1.4–7)
NEUTROPHILS # BLD AUTO: 8.58 10*3/MM3 (ref 1.4–7)
NEUTROPHILS # BLD AUTO: 8.59 10*3/MM3 (ref 1.4–7)
NEUTROPHILS # BLD AUTO: 9.43 10*3/MM3 (ref 1.9–8.1)
NEUTROPHILS NFR BLD AUTO: 61.5 % (ref 42.7–76)
NEUTROPHILS NFR BLD AUTO: 65.8 % (ref 42.7–76)
NEUTROPHILS NFR BLD AUTO: 66.4 % (ref 42.7–76)
NEUTROPHILS NFR BLD AUTO: 69.5 % (ref 42.7–76)
NEUTROPHILS NFR BLD AUTO: 72.9 % (ref 42.7–76)
NEUTROPHILS NFR BLD AUTO: 74.1 % (ref 42.7–76)
NEUTROPHILS NFR BLD AUTO: 77.6 % (ref 42.7–76)
NEUTROPHILS NFR BLD MANUAL: 68.4 % (ref 42.7–76)
NITRITE UR QL STRIP: NEGATIVE
NRBC BLD AUTO-RTO: 0 /100 WBC (ref 0–0)
NRBC BLD AUTO-RTO: 0 /100 WBC (ref 0–0)
NRBC BLD AUTO-RTO: 0.1 /100 WBC (ref 0–0)
NT-PROBNP SERPL-MCNC: 1222 PG/ML (ref 0–900)
PH UR STRIP.AUTO: 7.5 [PH] (ref 5–8)
PLAT MORPH BLD: NORMAL
PLATELET # BLD AUTO: 118 10*3/MM3 (ref 140–450)
PLATELET # BLD AUTO: 267 10*3/MM3 (ref 140–450)
PLATELET # BLD AUTO: 267 10*3/MM3 (ref 140–450)
PLATELET # BLD AUTO: 295 10*3/MM3 (ref 140–450)
PLATELET # BLD AUTO: 297 10*3/MM3 (ref 140–500)
PLATELET # BLD AUTO: 302 10*3/MM3 (ref 140–450)
PLATELET # BLD AUTO: 305 10*3/MM3 (ref 140–450)
PLATELET # BLD AUTO: 315 10*3/MM3 (ref 140–450)
PMV BLD AUTO: 10 FL (ref 6–12)
PMV BLD AUTO: 10.2 FL (ref 6–12)
PMV BLD AUTO: 10.3 FL (ref 6–12)
PMV BLD AUTO: 10.3 FL (ref 6–12)
PMV BLD AUTO: 10.5 FL (ref 6–12)
PMV BLD AUTO: 9.8 FL (ref 6–12)
PMV BLD AUTO: 9.9 FL (ref 6–12)
POTASSIUM BLD-SCNC: 3.2 MMOL/L (ref 3.5–5.2)
POTASSIUM BLD-SCNC: 3.4 MMOL/L (ref 3.5–5.2)
POTASSIUM BLD-SCNC: 3.5 MMOL/L (ref 3.5–5.2)
POTASSIUM BLD-SCNC: 3.6 MMOL/L (ref 3.5–5.2)
POTASSIUM BLD-SCNC: 3.7 MMOL/L (ref 3.5–5.2)
POTASSIUM BLD-SCNC: 4 MMOL/L (ref 3.5–5.2)
POTASSIUM BLD-SCNC: 4.3 MMOL/L (ref 3.5–5.2)
POTASSIUM SERPL-SCNC: 3.9 MMOL/L (ref 3.5–5.2)
PROT SERPL-MCNC: 4 G/DL (ref 6–8.5)
PROT SERPL-MCNC: 6 G/DL (ref 6–8.5)
PROT SERPL-MCNC: 6.6 G/DL (ref 6–8.5)
PROT UR QL STRIP: NEGATIVE
PROTHROMBIN TIME: 16.8 SECONDS (ref 11.7–14.2)
RBC # BLD AUTO: 3.1 10*6/MM3 (ref 3.77–5.28)
RBC # BLD AUTO: 3.1 10*6/MM3 (ref 3.77–5.28)
RBC # BLD AUTO: 3.23 10*6/MM3 (ref 3.9–5.2)
RBC # BLD AUTO: 3.43 10*6/MM3 (ref 3.77–5.28)
RBC # BLD AUTO: 3.61 10*6/MM3 (ref 3.77–5.28)
RBC # BLD AUTO: 3.8 10*6/MM3 (ref 3.77–5.28)
RBC # BLD AUTO: 3.87 10*6/MM3 (ref 3.77–5.28)
RBC # BLD AUTO: 3.96 10*6/MM3 (ref 3.77–5.28)
RBC # UR: ABNORMAL /HPF
REF LAB TEST METHOD: ABNORMAL
RETICS/RBC NFR AUTO: 2.96 % (ref 0.5–1.5)
RH BLD: POSITIVE
SODIUM BLD-SCNC: 139 MMOL/L (ref 136–145)
SODIUM BLD-SCNC: 139 MMOL/L (ref 136–145)
SODIUM BLD-SCNC: 140 MMOL/L (ref 136–145)
SODIUM BLD-SCNC: 142 MMOL/L (ref 136–145)
SODIUM BLD-SCNC: 148 MMOL/L (ref 136–145)
SODIUM SERPL-SCNC: 137 MMOL/L (ref 136–145)
SP GR UR STRIP: 1.01 (ref 1–1.03)
SPHEROCYTES BLD QL SMEAR: ABNORMAL
SQUAMOUS #/AREA URNS HPF: ABNORMAL /HPF
STRESS TARGET HR: 126 BPM
STRESS TARGET HR: 126 BPM
T&S EXPIRATION DATE: NORMAL
T4 FREE SERPL-MCNC: 1.61 NG/DL (ref 0.93–1.7)
T4 SERPL-MCNC: 8.39 MCG/DL (ref 4.5–11.7)
TIBC SERPL-MCNC: 346 MCG/DL
TRANSFERRIN SERPL-MCNC: 232 MG/DL (ref 200–360)
TRIGL SERPL-MCNC: 371 MG/DL (ref 0–150)
TROPONIN T SERPL-MCNC: 0.02 NG/ML (ref 0–0.03)
TROPONIN T SERPL-MCNC: 0.02 NG/ML (ref 0–0.03)
TSH SERPL DL<=0.005 MIU/L-ACNC: 0.13 MIU/ML (ref 0.27–4.2)
TSH SERPL DL<=0.05 MIU/L-ACNC: 0.18 MIU/ML (ref 0.27–4.2)
UNIT  ABO: NORMAL
UNIT  RH: NORMAL
UROBILINOGEN UR QL STRIP: ABNORMAL
VLDLC SERPL CALC-MCNC: 74.2 MG/DL (ref 5–40)
WBC # BLD AUTO: 12.15 10*3/MM3 (ref 4.5–10.7)
WBC MORPH BLD: NORMAL
WBC NRBC COR # BLD: 10.74 10*3/MM3 (ref 3.4–10.8)
WBC NRBC COR # BLD: 11.06 10*3/MM3 (ref 3.4–10.8)
WBC NRBC COR # BLD: 11.65 10*3/MM3 (ref 3.4–10.8)
WBC NRBC COR # BLD: 12.13 10*3/MM3 (ref 3.4–10.8)
WBC NRBC COR # BLD: 12.36 10*3/MM3 (ref 3.4–10.8)
WBC NRBC COR # BLD: 12.92 10*3/MM3 (ref 3.4–10.8)
WBC NRBC COR # BLD: 27.72 10*3/MM3 (ref 3.4–10.8)
WBC UR QL AUTO: ABNORMAL /HPF

## 2019-01-01 PROCEDURE — 93005 ELECTROCARDIOGRAM TRACING: CPT | Performed by: INTERNAL MEDICINE

## 2019-01-01 PROCEDURE — 83880 ASSAY OF NATRIURETIC PEPTIDE: CPT | Performed by: INTERNAL MEDICINE

## 2019-01-01 PROCEDURE — 86923 COMPATIBILITY TEST ELECTRIC: CPT

## 2019-01-01 PROCEDURE — C1729 CATH, DRAINAGE: HCPCS | Performed by: INTERNAL MEDICINE

## 2019-01-01 PROCEDURE — 94799 UNLISTED PULMONARY SVC/PX: CPT

## 2019-01-01 PROCEDURE — 99222 1ST HOSP IP/OBS MODERATE 55: CPT | Performed by: INTERNAL MEDICINE

## 2019-01-01 PROCEDURE — C1898 LEAD, PMKR, OTHER THAN TRANS: HCPCS | Performed by: INTERNAL MEDICINE

## 2019-01-01 PROCEDURE — C1894 INTRO/SHEATH, NON-LASER: HCPCS | Performed by: INTERNAL MEDICINE

## 2019-01-01 PROCEDURE — 63710000001 PREDNISONE PER 5 MG: Performed by: INTERNAL MEDICINE

## 2019-01-01 PROCEDURE — 93306 TTE W/DOPPLER COMPLETE: CPT

## 2019-01-01 PROCEDURE — 99153 MOD SED SAME PHYS/QHP EA: CPT | Performed by: INTERNAL MEDICINE

## 2019-01-01 PROCEDURE — 25010000003 EPINEPHRINE 30 MG/30ML SOLUTION 30 ML VIAL: Performed by: INTERNAL MEDICINE

## 2019-01-01 PROCEDURE — 99285 EMERGENCY DEPT VISIT HI MDM: CPT

## 2019-01-01 PROCEDURE — 80048 BASIC METABOLIC PNL TOTAL CA: CPT | Performed by: INTERNAL MEDICINE

## 2019-01-01 PROCEDURE — 25010000002 EPINEPHRINE PER 0.1 MG: Performed by: INTERNAL MEDICINE

## 2019-01-01 PROCEDURE — 5A12012 PERFORMANCE OF CARDIAC OUTPUT, SINGLE, MANUAL: ICD-10-PCS | Performed by: INTERNAL MEDICINE

## 2019-01-01 PROCEDURE — 81001 URINALYSIS AUTO W/SCOPE: CPT | Performed by: EMERGENCY MEDICINE

## 2019-01-01 PROCEDURE — 25010000002 PROTAMINE SULFATE PER 10 MG: Performed by: INTERNAL MEDICINE

## 2019-01-01 PROCEDURE — 5A1223Z PERFORMANCE OF CARDIAC PACING, CONTINUOUS: ICD-10-PCS | Performed by: INTERNAL MEDICINE

## 2019-01-01 PROCEDURE — 93010 ELECTROCARDIOGRAM REPORT: CPT | Performed by: INTERNAL MEDICINE

## 2019-01-01 PROCEDURE — 33010 PR PERICARDIOCENTESIS INITIAL: CPT | Performed by: INTERNAL MEDICINE

## 2019-01-01 PROCEDURE — 99232 SBSQ HOSP IP/OBS MODERATE 35: CPT | Performed by: NURSE PRACTITIONER

## 2019-01-01 PROCEDURE — 25010000002 EPINEPHRINE PF 1 MG/10ML SOLUTION PREFILLED SYRINGE: Performed by: INTERNAL MEDICINE

## 2019-01-01 PROCEDURE — 85014 HEMATOCRIT: CPT

## 2019-01-01 PROCEDURE — 33274 TCAT INSJ/RPL PERM LDLS PM: CPT | Performed by: INTERNAL MEDICINE

## 2019-01-01 PROCEDURE — C1769 GUIDE WIRE: HCPCS | Performed by: INTERNAL MEDICINE

## 2019-01-01 PROCEDURE — 76830 TRANSVAGINAL US NON-OB: CPT

## 2019-01-01 PROCEDURE — 33210 INSERT ELECTRD/PM CATH SNGL: CPT | Performed by: INTERNAL MEDICINE

## 2019-01-01 PROCEDURE — 82728 ASSAY OF FERRITIN: CPT | Performed by: NURSE PRACTITIONER

## 2019-01-01 PROCEDURE — 99214 OFFICE O/P EST MOD 30 MIN: CPT | Performed by: INTERNAL MEDICINE

## 2019-01-01 PROCEDURE — 99214 OFFICE O/P EST MOD 30 MIN: CPT | Performed by: ANESTHESIOLOGY

## 2019-01-01 PROCEDURE — 25010000002 VANCOMYCIN PER 500 MG: Performed by: INTERNAL MEDICINE

## 2019-01-01 PROCEDURE — 85018 HEMOGLOBIN: CPT | Performed by: NURSE PRACTITIONER

## 2019-01-01 PROCEDURE — 93308 TTE F-UP OR LMTD: CPT | Performed by: INTERNAL MEDICINE

## 2019-01-01 PROCEDURE — 99233 SBSQ HOSP IP/OBS HIGH 50: CPT | Performed by: INTERNAL MEDICINE

## 2019-01-01 PROCEDURE — 85025 COMPLETE CBC W/AUTO DIFF WBC: CPT | Performed by: INTERNAL MEDICINE

## 2019-01-01 PROCEDURE — 86850 RBC ANTIBODY SCREEN: CPT | Performed by: EMERGENCY MEDICINE

## 2019-01-01 PROCEDURE — C1786 PMKR, SINGLE, RATE-RESP: HCPCS | Performed by: INTERNAL MEDICINE

## 2019-01-01 PROCEDURE — 25010000002 FENTANYL CITRATE (PF) 100 MCG/2ML SOLUTION: Performed by: INTERNAL MEDICINE

## 2019-01-01 PROCEDURE — C1892 INTRO/SHEATH,FIXED,PEEL-AWAY: HCPCS | Performed by: INTERNAL MEDICINE

## 2019-01-01 PROCEDURE — 36415 COLL VENOUS BLD VENIPUNCTURE: CPT | Performed by: INTERNAL MEDICINE

## 2019-01-01 PROCEDURE — 80053 COMPREHEN METABOLIC PANEL: CPT | Performed by: NURSE PRACTITIONER

## 2019-01-01 PROCEDURE — 85018 HEMOGLOBIN: CPT

## 2019-01-01 PROCEDURE — 85025 COMPLETE CBC W/AUTO DIFF WBC: CPT | Performed by: EMERGENCY MEDICINE

## 2019-01-01 PROCEDURE — 25010000002 ENOXAPARIN PER 10 MG: Performed by: INTERNAL MEDICINE

## 2019-01-01 PROCEDURE — 97161 PT EVAL LOW COMPLEX 20 MIN: CPT

## 2019-01-01 PROCEDURE — 85014 HEMATOCRIT: CPT | Performed by: NURSE PRACTITIONER

## 2019-01-01 PROCEDURE — 93005 ELECTROCARDIOGRAM TRACING: CPT | Performed by: NURSE PRACTITIONER

## 2019-01-01 PROCEDURE — P9016 RBC LEUKOCYTES REDUCED: HCPCS

## 2019-01-01 PROCEDURE — 25010000002 MIDAZOLAM PER 1 MG: Performed by: INTERNAL MEDICINE

## 2019-01-01 PROCEDURE — 99231 SBSQ HOSP IP/OBS SF/LOW 25: CPT | Performed by: INTERNAL MEDICINE

## 2019-01-01 PROCEDURE — 93308 TTE F-UP OR LMTD: CPT

## 2019-01-01 PROCEDURE — 84484 ASSAY OF TROPONIN QUANT: CPT | Performed by: INTERNAL MEDICINE

## 2019-01-01 PROCEDURE — 25010000002 METHYLPREDNISOLONE PER 125 MG: Performed by: INTERNAL MEDICINE

## 2019-01-01 PROCEDURE — 85610 PROTHROMBIN TIME: CPT | Performed by: EMERGENCY MEDICINE

## 2019-01-01 PROCEDURE — 76856 US EXAM PELVIC COMPLETE: CPT

## 2019-01-01 PROCEDURE — 5A1935Z RESPIRATORY VENTILATION, LESS THAN 24 CONSECUTIVE HOURS: ICD-10-PCS | Performed by: INTERNAL MEDICINE

## 2019-01-01 PROCEDURE — 0BH17EZ INSERTION OF ENDOTRACHEAL AIRWAY INTO TRACHEA, VIA NATURAL OR ARTIFICIAL OPENING: ICD-10-PCS | Performed by: INTERNAL MEDICINE

## 2019-01-01 PROCEDURE — 99152 MOD SED SAME PHYS/QHP 5/>YRS: CPT | Performed by: INTERNAL MEDICINE

## 2019-01-01 PROCEDURE — 92950 HEART/LUNG RESUSCITATION CPR: CPT | Performed by: INTERNAL MEDICINE

## 2019-01-01 PROCEDURE — 94002 VENT MGMT INPAT INIT DAY: CPT

## 2019-01-01 PROCEDURE — 25010000002 HEPARIN (PORCINE) PER 1000 UNITS: Performed by: INTERNAL MEDICINE

## 2019-01-01 PROCEDURE — 85045 AUTOMATED RETICULOCYTE COUNT: CPT | Performed by: NURSE PRACTITIONER

## 2019-01-01 PROCEDURE — 99221 1ST HOSP IP/OBS SF/LOW 40: CPT | Performed by: NURSE PRACTITIONER

## 2019-01-01 PROCEDURE — 84443 ASSAY THYROID STIM HORMONE: CPT | Performed by: NURSE PRACTITIONER

## 2019-01-01 PROCEDURE — 0 IOPAMIDOL PER 1 ML: Performed by: INTERNAL MEDICINE

## 2019-01-01 PROCEDURE — 84466 ASSAY OF TRANSFERRIN: CPT | Performed by: NURSE PRACTITIONER

## 2019-01-01 PROCEDURE — 84439 ASSAY OF FREE THYROXINE: CPT | Performed by: NURSE PRACTITIONER

## 2019-01-01 PROCEDURE — 0W9D3ZZ DRAINAGE OF PERICARDIAL CAVITY, PERCUTANEOUS APPROACH: ICD-10-PCS | Performed by: INTERNAL MEDICINE

## 2019-01-01 PROCEDURE — 86901 BLOOD TYPING SEROLOGIC RH(D): CPT | Performed by: EMERGENCY MEDICINE

## 2019-01-01 PROCEDURE — 83540 ASSAY OF IRON: CPT | Performed by: NURSE PRACTITIONER

## 2019-01-01 PROCEDURE — 93306 TTE W/DOPPLER COMPLETE: CPT | Performed by: INTERNAL MEDICINE

## 2019-01-01 PROCEDURE — 86900 BLOOD TYPING SEROLOGIC ABO: CPT | Performed by: EMERGENCY MEDICINE

## 2019-01-01 PROCEDURE — 80053 COMPREHEN METABOLIC PANEL: CPT | Performed by: INTERNAL MEDICINE

## 2019-01-01 PROCEDURE — 85007 BL SMEAR W/DIFF WBC COUNT: CPT | Performed by: INTERNAL MEDICINE

## 2019-01-01 PROCEDURE — 33010 HC PERICARDIOCENTESIS INITIAL: CPT | Performed by: INTERNAL MEDICINE

## 2019-01-01 PROCEDURE — 86900 BLOOD TYPING SEROLOGIC ABO: CPT

## 2019-01-01 PROCEDURE — 36430 TRANSFUSION BLD/BLD COMPNT: CPT

## 2019-01-01 PROCEDURE — 82962 GLUCOSE BLOOD TEST: CPT

## 2019-01-01 DEVICE — LD PM CAPSUREFIX NOVUS5076 52CM: Type: IMPLANTABLE DEVICE | Status: FUNCTIONAL

## 2019-01-01 RX ORDER — LEVOTHYROXINE SODIUM ANHYDROUS 100 UG/5ML
50 INJECTION, POWDER, LYOPHILIZED, FOR SOLUTION INTRAVENOUS
Status: DISCONTINUED | OUTPATIENT
Start: 2019-01-01 | End: 2019-03-06 | Stop reason: HOSPADM

## 2019-01-01 RX ORDER — METHYLPREDNISOLONE SODIUM SUCCINATE 125 MG/2ML
INJECTION, POWDER, LYOPHILIZED, FOR SOLUTION INTRAMUSCULAR; INTRAVENOUS AS NEEDED
Status: DISCONTINUED | OUTPATIENT
Start: 2019-01-01 | End: 2019-01-01 | Stop reason: HOSPADM

## 2019-01-01 RX ORDER — FUROSEMIDE 40 MG/1
40 TABLET ORAL DAILY
COMMUNITY

## 2019-01-01 RX ORDER — OXYCODONE AND ACETAMINOPHEN 10; 325 MG/1; MG/1
1 TABLET ORAL EVERY 4 HOURS PRN
Status: DISCONTINUED | OUTPATIENT
Start: 2019-01-01 | End: 2019-01-01

## 2019-01-01 RX ORDER — DULOXETIN HYDROCHLORIDE 30 MG/1
30 CAPSULE, DELAYED RELEASE ORAL 2 TIMES DAILY
Start: 2019-01-01 | End: 2019-01-01 | Stop reason: SDUPTHER

## 2019-01-01 RX ORDER — SODIUM CHLORIDE 0.9 % (FLUSH) 0.9 %
3-10 SYRINGE (ML) INJECTION AS NEEDED
Status: DISCONTINUED | OUTPATIENT
Start: 2019-01-01 | End: 2019-03-06 | Stop reason: HOSPADM

## 2019-01-01 RX ORDER — ACETAMINOPHEN 325 MG/1
650 TABLET ORAL EVERY 4 HOURS PRN
Status: DISCONTINUED | OUTPATIENT
Start: 2019-01-01 | End: 2019-03-06 | Stop reason: HOSPADM

## 2019-01-01 RX ORDER — DULOXETIN HYDROCHLORIDE 60 MG/1
60 CAPSULE, DELAYED RELEASE ORAL 2 TIMES DAILY
Status: DISCONTINUED | OUTPATIENT
Start: 2019-01-01 | End: 2019-01-01

## 2019-01-01 RX ORDER — ACETAMINOPHEN 325 MG/1
650 TABLET ORAL EVERY 4 HOURS PRN
Status: DISCONTINUED | OUTPATIENT
Start: 2019-01-01 | End: 2019-01-01

## 2019-01-01 RX ORDER — LEVOTHYROXINE SODIUM 0.1 MG/1
100 TABLET ORAL DAILY
Status: DISCONTINUED | OUTPATIENT
Start: 2019-01-01 | End: 2019-01-01

## 2019-01-01 RX ORDER — POTASSIUM CHLORIDE 750 MG/1
40 CAPSULE, EXTENDED RELEASE ORAL ONCE
Status: COMPLETED | OUTPATIENT
Start: 2019-01-01 | End: 2019-01-01

## 2019-01-01 RX ORDER — PREDNISONE 10 MG/1
10 TABLET ORAL DAILY
COMMUNITY

## 2019-01-01 RX ORDER — FENTANYL CITRATE 50 UG/ML
INJECTION, SOLUTION INTRAMUSCULAR; INTRAVENOUS AS NEEDED
Status: DISCONTINUED | OUTPATIENT
Start: 2019-01-01 | End: 2019-01-01 | Stop reason: HOSPADM

## 2019-01-01 RX ORDER — ATORVASTATIN CALCIUM 20 MG/1
20 TABLET, FILM COATED ORAL DAILY
COMMUNITY

## 2019-01-01 RX ORDER — PREDNISONE 10 MG/1
10 TABLET ORAL DAILY
Status: DISCONTINUED | OUTPATIENT
Start: 2019-01-01 | End: 2019-01-01

## 2019-01-01 RX ORDER — ALPRAZOLAM 0.5 MG/1
0.5 TABLET ORAL 2 TIMES DAILY PRN
Status: DISCONTINUED | OUTPATIENT
Start: 2019-01-01 | End: 2019-01-01

## 2019-01-01 RX ORDER — SODIUM CHLORIDE 9 MG/ML
INJECTION, SOLUTION INTRAVENOUS CONTINUOUS PRN
Status: COMPLETED | OUTPATIENT
Start: 2019-01-01 | End: 2019-01-01

## 2019-01-01 RX ORDER — ATORVASTATIN CALCIUM 20 MG/1
20 TABLET, FILM COATED ORAL DAILY
Status: DISCONTINUED | OUTPATIENT
Start: 2019-01-01 | End: 2019-01-01

## 2019-01-01 RX ORDER — FERROUS SULFATE 325(65) MG
325 TABLET ORAL 2 TIMES DAILY WITH MEALS
Status: DISCONTINUED | OUTPATIENT
Start: 2019-01-01 | End: 2019-01-01

## 2019-01-01 RX ORDER — QUETIAPINE FUMARATE 25 MG/1
TABLET, FILM COATED ORAL
Qty: 7 TABLET | Refills: 2 | Status: SHIPPED | OUTPATIENT
Start: 2019-01-01 | End: 2019-01-01 | Stop reason: SDUPTHER

## 2019-01-01 RX ORDER — PANTOPRAZOLE SODIUM 40 MG/1
40 TABLET, DELAYED RELEASE ORAL EVERY MORNING
Status: DISCONTINUED | OUTPATIENT
Start: 2019-01-01 | End: 2019-01-01

## 2019-01-01 RX ORDER — TRAZODONE HYDROCHLORIDE 50 MG/1
50 TABLET ORAL NIGHTLY PRN
Status: DISCONTINUED | OUTPATIENT
Start: 2019-01-01 | End: 2019-01-01

## 2019-01-01 RX ORDER — SODIUM CHLORIDE, SODIUM LACTATE, POTASSIUM CHLORIDE, CALCIUM CHLORIDE 600; 310; 30; 20 MG/100ML; MG/100ML; MG/100ML; MG/100ML
100 INJECTION, SOLUTION INTRAVENOUS CONTINUOUS
Status: DISCONTINUED | OUTPATIENT
Start: 2019-01-01 | End: 2019-03-06 | Stop reason: HOSPADM

## 2019-01-01 RX ORDER — ONDANSETRON 4 MG/1
4 TABLET, FILM COATED ORAL EVERY 6 HOURS PRN
Status: DISCONTINUED | OUTPATIENT
Start: 2019-01-01 | End: 2019-01-01

## 2019-01-01 RX ORDER — AMLODIPINE BESYLATE 10 MG/1
10 TABLET ORAL DAILY
Qty: 30 TABLET | Refills: 2 | Status: SHIPPED | OUTPATIENT
Start: 2019-01-01

## 2019-01-01 RX ORDER — ALPRAZOLAM 0.5 MG/1
0.5 TABLET ORAL 2 TIMES DAILY PRN
Qty: 60 TABLET | Refills: 2 | Status: SHIPPED | OUTPATIENT
Start: 2019-01-01

## 2019-01-01 RX ORDER — POTASSIUM CHLORIDE 20 MEQ/1
TABLET, EXTENDED RELEASE ORAL
Qty: 60 TABLET | Refills: 1 | Status: SHIPPED | OUTPATIENT
Start: 2019-01-01 | End: 2019-01-01

## 2019-01-01 RX ORDER — ACETAMINOPHEN 325 MG/1
650 TABLET ORAL 4 TIMES DAILY
Status: DISCONTINUED | OUTPATIENT
Start: 2019-01-01 | End: 2019-01-01

## 2019-01-01 RX ORDER — QUETIAPINE FUMARATE 25 MG/1
12.5 TABLET, FILM COATED ORAL 2 TIMES DAILY
Status: DISCONTINUED | OUTPATIENT
Start: 2019-01-01 | End: 2019-01-01

## 2019-01-01 RX ORDER — HEPARIN SODIUM 1000 [USP'U]/ML
INJECTION, SOLUTION INTRAVENOUS; SUBCUTANEOUS AS NEEDED
Status: DISCONTINUED | OUTPATIENT
Start: 2019-01-01 | End: 2019-01-01 | Stop reason: HOSPADM

## 2019-01-01 RX ORDER — HYDROMORPHONE HYDROCHLORIDE 1 MG/ML
0.5 INJECTION, SOLUTION INTRAMUSCULAR; INTRAVENOUS; SUBCUTANEOUS
Status: DISCONTINUED | OUTPATIENT
Start: 2019-01-01 | End: 2019-01-01

## 2019-01-01 RX ORDER — CARVEDILOL 12.5 MG/1
12.5 TABLET ORAL 2 TIMES DAILY WITH MEALS
COMMUNITY

## 2019-01-01 RX ORDER — VANCOMYCIN HYDROCHLORIDE 1 G/200ML
1000 INJECTION, SOLUTION INTRAVENOUS
Status: DISCONTINUED | OUTPATIENT
Start: 2019-01-01 | End: 2019-01-01

## 2019-01-01 RX ORDER — ZOLPIDEM TARTRATE 5 MG/1
5 TABLET ORAL NIGHTLY PRN
Status: ON HOLD | COMMUNITY
End: 2019-01-01

## 2019-01-01 RX ORDER — NYSTATIN 100000 [USP'U]/G
POWDER TOPICAL EVERY 12 HOURS SCHEDULED
Status: DISCONTINUED | OUTPATIENT
Start: 2019-01-01 | End: 2019-03-06 | Stop reason: HOSPADM

## 2019-01-01 RX ORDER — EPINEPHRINE 1 MG/ML
INJECTION, SOLUTION, CONCENTRATE INTRAVENOUS AS NEEDED
Status: DISCONTINUED | OUTPATIENT
Start: 2019-01-01 | End: 2019-01-01 | Stop reason: HOSPADM

## 2019-01-01 RX ORDER — OXYCODONE AND ACETAMINOPHEN 10; 325 MG/1; MG/1
1 TABLET ORAL EVERY 4 HOURS PRN
Qty: 180 TABLET | Refills: 0 | Status: SHIPPED | OUTPATIENT
Start: 2019-01-01

## 2019-01-01 RX ORDER — TRAZODONE HYDROCHLORIDE 50 MG/1
50 TABLET ORAL NIGHTLY PRN
COMMUNITY

## 2019-01-01 RX ORDER — PANTOPRAZOLE SODIUM 40 MG/10ML
40 INJECTION, POWDER, LYOPHILIZED, FOR SOLUTION INTRAVENOUS
Status: DISCONTINUED | OUTPATIENT
Start: 2019-03-06 | End: 2019-03-06 | Stop reason: HOSPADM

## 2019-01-01 RX ORDER — SODIUM CHLORIDE 0.9 % (FLUSH) 0.9 %
3 SYRINGE (ML) INJECTION EVERY 12 HOURS SCHEDULED
Status: DISCONTINUED | OUTPATIENT
Start: 2019-01-01 | End: 2019-01-01

## 2019-01-01 RX ORDER — FUROSEMIDE 40 MG/1
TABLET ORAL
Qty: 60 TABLET | Refills: 2 | Status: SHIPPED | OUTPATIENT
Start: 2019-01-01 | End: 2019-01-01

## 2019-01-01 RX ORDER — FUROSEMIDE 40 MG/1
40 TABLET ORAL DAILY
Status: DISCONTINUED | OUTPATIENT
Start: 2019-01-01 | End: 2019-01-01

## 2019-01-01 RX ORDER — DULOXETIN HYDROCHLORIDE 60 MG/1
CAPSULE, DELAYED RELEASE ORAL
Qty: 30 CAPSULE | Refills: 2 | Status: SHIPPED | OUTPATIENT
Start: 2019-01-01 | End: 2019-01-01

## 2019-01-01 RX ORDER — SODIUM CHLORIDE 0.9 % (FLUSH) 0.9 %
10 SYRINGE (ML) INJECTION AS NEEDED
Status: DISCONTINUED | OUTPATIENT
Start: 2019-01-01 | End: 2019-03-06 | Stop reason: HOSPADM

## 2019-01-01 RX ORDER — CHLORHEXIDINE GLUCONATE 0.12 MG/ML
15 RINSE ORAL EVERY 12 HOURS SCHEDULED
Status: DISCONTINUED | OUTPATIENT
Start: 2019-01-01 | End: 2019-03-06 | Stop reason: HOSPADM

## 2019-01-01 RX ORDER — NALOXONE HCL 0.4 MG/ML
0.4 VIAL (ML) INJECTION
Status: DISCONTINUED | OUTPATIENT
Start: 2019-01-01 | End: 2019-01-01

## 2019-01-01 RX ORDER — SODIUM CHLORIDE 0.9 % (FLUSH) 0.9 %
3 SYRINGE (ML) INJECTION EVERY 12 HOURS SCHEDULED
Status: DISCONTINUED | OUTPATIENT
Start: 2019-01-01 | End: 2019-03-06 | Stop reason: HOSPADM

## 2019-01-01 RX ORDER — POTASSIUM CHLORIDE 750 MG/1
20 CAPSULE, EXTENDED RELEASE ORAL 2 TIMES DAILY WITH MEALS
Status: DISCONTINUED | OUTPATIENT
Start: 2019-01-01 | End: 2019-01-01

## 2019-01-01 RX ORDER — ISOSORBIDE MONONITRATE 30 MG/1
30 TABLET, EXTENDED RELEASE ORAL DAILY
Qty: 30 TABLET | Refills: 3 | Status: SHIPPED | OUTPATIENT
Start: 2019-01-01

## 2019-01-01 RX ORDER — PROTAMINE SULFATE 10 MG/ML
INJECTION, SOLUTION INTRAVENOUS AS NEEDED
Status: DISCONTINUED | OUTPATIENT
Start: 2019-01-01 | End: 2019-01-01 | Stop reason: HOSPADM

## 2019-01-01 RX ORDER — FUROSEMIDE 40 MG/1
40 TABLET ORAL 2 TIMES DAILY
Qty: 60 TABLET | Refills: 2
Start: 2019-01-01 | End: 2019-01-01 | Stop reason: SDUPTHER

## 2019-01-01 RX ORDER — ONDANSETRON 2 MG/ML
4 INJECTION INTRAMUSCULAR; INTRAVENOUS EVERY 6 HOURS PRN
Status: DISCONTINUED | OUTPATIENT
Start: 2019-01-01 | End: 2019-01-01

## 2019-01-01 RX ORDER — ISOSORBIDE MONONITRATE 30 MG/1
30 TABLET, EXTENDED RELEASE ORAL DAILY
Status: DISCONTINUED | OUTPATIENT
Start: 2019-01-01 | End: 2019-01-01

## 2019-01-01 RX ORDER — POTASSIUM CHLORIDE 20 MEQ/1
20 TABLET, EXTENDED RELEASE ORAL 2 TIMES DAILY
COMMUNITY

## 2019-01-01 RX ORDER — DULOXETIN HYDROCHLORIDE 60 MG/1
60 CAPSULE, DELAYED RELEASE ORAL 2 TIMES DAILY
COMMUNITY

## 2019-01-01 RX ORDER — QUETIAPINE FUMARATE 25 MG/1
12.5 TABLET, FILM COATED ORAL 2 TIMES DAILY
COMMUNITY

## 2019-01-01 RX ORDER — SODIUM CHLORIDE 9 MG/ML
INJECTION, SOLUTION INTRAVENOUS CONTINUOUS PRN
Status: DISCONTINUED | OUTPATIENT
Start: 2019-01-01 | End: 2019-01-01 | Stop reason: HOSPADM

## 2019-01-01 RX ORDER — LIDOCAINE HYDROCHLORIDE AND EPINEPHRINE 10; 10 MG/ML; UG/ML
INJECTION, SOLUTION INFILTRATION; PERINEURAL AS NEEDED
Status: DISCONTINUED | OUTPATIENT
Start: 2019-01-01 | End: 2019-01-01 | Stop reason: HOSPADM

## 2019-01-01 RX ORDER — MIDAZOLAM HYDROCHLORIDE 1 MG/ML
INJECTION INTRAMUSCULAR; INTRAVENOUS AS NEEDED
Status: DISCONTINUED | OUTPATIENT
Start: 2019-01-01 | End: 2019-01-01 | Stop reason: HOSPADM

## 2019-01-01 RX ORDER — PREDNISONE 10 MG/1
10 TABLET ORAL DAILY
Status: DISCONTINUED | OUTPATIENT
Start: 2019-01-01 | End: 2019-01-01 | Stop reason: SDUPTHER

## 2019-01-01 RX ORDER — VANCOMYCIN HYDROCHLORIDE 1 G/200ML
INJECTION, SOLUTION INTRAVENOUS CONTINUOUS PRN
Status: DISCONTINUED | OUTPATIENT
Start: 2019-01-01 | End: 2019-01-01 | Stop reason: HOSPADM

## 2019-01-01 RX ORDER — CARVEDILOL 12.5 MG/1
TABLET ORAL
Qty: 60 TABLET | Refills: 1 | Status: SHIPPED | OUTPATIENT
Start: 2019-01-01 | End: 2019-01-01

## 2019-01-01 RX ORDER — FENTANYL CITRATE 50 UG/ML
50 INJECTION, SOLUTION INTRAMUSCULAR; INTRAVENOUS
Status: DISCONTINUED | OUTPATIENT
Start: 2019-01-01 | End: 2019-03-06 | Stop reason: HOSPADM

## 2019-01-01 RX ORDER — CARVEDILOL 12.5 MG/1
12.5 TABLET ORAL 2 TIMES DAILY
Status: DISCONTINUED | OUTPATIENT
Start: 2019-01-01 | End: 2019-01-01

## 2019-01-01 RX ORDER — ACETAMINOPHEN 650 MG/1
650 SUPPOSITORY RECTAL EVERY 4 HOURS PRN
Status: DISCONTINUED | OUTPATIENT
Start: 2019-01-01 | End: 2019-03-06 | Stop reason: HOSPADM

## 2019-01-01 RX ORDER — ONDANSETRON 4 MG/1
4 TABLET, ORALLY DISINTEGRATING ORAL EVERY 6 HOURS PRN
Status: DISCONTINUED | OUTPATIENT
Start: 2019-01-01 | End: 2019-01-01

## 2019-01-01 RX ORDER — DOXYCYCLINE 25 MG/5ML
50 POWDER, FOR SUSPENSION ORAL EVERY 12 HOURS SCHEDULED
Status: DISCONTINUED | OUTPATIENT
Start: 2019-01-01 | End: 2019-01-01

## 2019-01-01 RX ORDER — ASPIRIN 81 MG/1
81 TABLET, CHEWABLE ORAL DAILY
Status: DISCONTINUED | OUTPATIENT
Start: 2019-01-01 | End: 2019-01-01

## 2019-01-01 RX ORDER — QUETIAPINE FUMARATE 25 MG/1
TABLET, FILM COATED ORAL
Qty: 7 TABLET | Refills: 2 | Status: SHIPPED | OUTPATIENT
Start: 2019-01-01 | End: 2019-01-01

## 2019-01-01 RX ADMIN — ASPIRIN 81 MG: 81 TABLET, CHEWABLE ORAL at 08:49

## 2019-01-01 RX ADMIN — POTASSIUM CHLORIDE 20 MEQ: 750 CAPSULE, EXTENDED RELEASE ORAL at 21:39

## 2019-01-01 RX ADMIN — ISOSORBIDE MONONITRATE 30 MG: 30 TABLET ORAL at 08:52

## 2019-01-01 RX ADMIN — ATORVASTATIN CALCIUM 20 MG: 20 TABLET, FILM COATED ORAL at 22:30

## 2019-01-01 RX ADMIN — NYSTATIN: 100000 POWDER TOPICAL at 21:02

## 2019-01-01 RX ADMIN — PANTOPRAZOLE SODIUM 40 MG: 40 TABLET, DELAYED RELEASE ORAL at 08:57

## 2019-01-01 RX ADMIN — DULOXETINE HYDROCHLORIDE 60 MG: 60 CAPSULE, DELAYED RELEASE ORAL at 22:30

## 2019-01-01 RX ADMIN — SODIUM CHLORIDE, PRESERVATIVE FREE 3 ML: 5 INJECTION INTRAVENOUS at 21:39

## 2019-01-01 RX ADMIN — ALPRAZOLAM 0.5 MG: 0.5 TABLET ORAL at 12:22

## 2019-01-01 RX ADMIN — OXYCODONE HYDROCHLORIDE AND ACETAMINOPHEN 1 TABLET: 10; 325 TABLET ORAL at 18:04

## 2019-01-01 RX ADMIN — PREDNISONE 10 MG: 10 TABLET ORAL at 08:52

## 2019-01-01 RX ADMIN — FERROUS SULFATE TAB 325 MG (65 MG ELEMENTAL FE) 325 MG: 325 (65 FE) TAB at 14:07

## 2019-01-01 RX ADMIN — FERROUS SULFATE TAB 325 MG (65 MG ELEMENTAL FE) 325 MG: 325 (65 FE) TAB at 18:01

## 2019-01-01 RX ADMIN — DULOXETINE HYDROCHLORIDE 60 MG: 60 CAPSULE, DELAYED RELEASE ORAL at 21:02

## 2019-01-01 RX ADMIN — LEVOTHYROXINE SODIUM 100 MCG: 100 TABLET ORAL at 08:57

## 2019-01-01 RX ADMIN — LEVOTHYROXINE SODIUM 100 MCG: 100 TABLET ORAL at 06:38

## 2019-01-01 RX ADMIN — SODIUM CHLORIDE, PRESERVATIVE FREE 3 ML: 5 INJECTION INTRAVENOUS at 21:02

## 2019-01-01 RX ADMIN — DULOXETINE HYDROCHLORIDE 60 MG: 60 CAPSULE, DELAYED RELEASE ORAL at 20:00

## 2019-01-01 RX ADMIN — ALPRAZOLAM 0.5 MG: 0.5 TABLET ORAL at 21:02

## 2019-01-01 RX ADMIN — ALPRAZOLAM 0.5 MG: 0.5 TABLET ORAL at 20:00

## 2019-01-01 RX ADMIN — ALPRAZOLAM 0.5 MG: 0.5 TABLET ORAL at 22:39

## 2019-01-01 RX ADMIN — DOXYCYCLINE 50 MG: 25 FOR SUSPENSION ORAL at 22:36

## 2019-01-01 RX ADMIN — QUETIAPINE FUMARATE 12.5 MG: 25 TABLET ORAL at 08:49

## 2019-01-01 RX ADMIN — ASPIRIN 81 MG: 81 TABLET, CHEWABLE ORAL at 08:50

## 2019-01-01 RX ADMIN — TRAZODONE HYDROCHLORIDE 50 MG: 100 TABLET ORAL at 22:36

## 2019-01-01 RX ADMIN — OXYCODONE HYDROCHLORIDE AND ACETAMINOPHEN 1 TABLET: 10; 325 TABLET ORAL at 15:07

## 2019-01-01 RX ADMIN — QUETIAPINE FUMARATE 12.5 MG: 25 TABLET ORAL at 08:52

## 2019-01-01 RX ADMIN — NYSTATIN: 100000 POWDER TOPICAL at 20:03

## 2019-01-01 RX ADMIN — OXYCODONE HYDROCHLORIDE AND ACETAMINOPHEN 1 TABLET: 10; 325 TABLET ORAL at 00:08

## 2019-01-01 RX ADMIN — NYSTATIN: 100000 POWDER TOPICAL at 08:52

## 2019-01-01 RX ADMIN — LEVOTHYROXINE SODIUM 100 MCG: 100 TABLET ORAL at 06:42

## 2019-01-01 RX ADMIN — OXYCODONE HYDROCHLORIDE AND ACETAMINOPHEN 1 TABLET: 10; 325 TABLET ORAL at 20:00

## 2019-01-01 RX ADMIN — SODIUM CHLORIDE, PRESERVATIVE FREE 3 ML: 5 INJECTION INTRAVENOUS at 21:00

## 2019-01-01 RX ADMIN — POTASSIUM CHLORIDE 40 MEQ: 750 CAPSULE, EXTENDED RELEASE ORAL at 10:45

## 2019-01-01 RX ADMIN — POTASSIUM CHLORIDE 20 MEQ: 750 CAPSULE, EXTENDED RELEASE ORAL at 22:31

## 2019-01-01 RX ADMIN — OXYCODONE HYDROCHLORIDE AND ACETAMINOPHEN 1 TABLET: 10; 325 TABLET ORAL at 13:02

## 2019-01-01 RX ADMIN — DULOXETINE HYDROCHLORIDE 60 MG: 60 CAPSULE, DELAYED RELEASE ORAL at 08:49

## 2019-01-01 RX ADMIN — ISOSORBIDE MONONITRATE 30 MG: 30 TABLET ORAL at 08:49

## 2019-01-01 RX ADMIN — DOXYCYCLINE 50 MG: 25 FOR SUSPENSION ORAL at 10:46

## 2019-01-01 RX ADMIN — ATORVASTATIN CALCIUM 20 MG: 20 TABLET, FILM COATED ORAL at 08:52

## 2019-01-01 RX ADMIN — TRAZODONE HYDROCHLORIDE 50 MG: 100 TABLET ORAL at 20:01

## 2019-01-01 RX ADMIN — FUROSEMIDE 40 MG: 40 TABLET ORAL at 12:17

## 2019-01-01 RX ADMIN — DULOXETINE HYDROCHLORIDE 60 MG: 60 CAPSULE, DELAYED RELEASE ORAL at 21:39

## 2019-01-01 RX ADMIN — FUROSEMIDE 40 MG: 40 TABLET ORAL at 08:50

## 2019-01-01 RX ADMIN — PANTOPRAZOLE SODIUM 40 MG: 40 TABLET, DELAYED RELEASE ORAL at 06:00

## 2019-01-01 RX ADMIN — POTASSIUM CHLORIDE 40 MEQ: 750 CAPSULE, EXTENDED RELEASE ORAL at 23:45

## 2019-01-01 RX ADMIN — POTASSIUM CHLORIDE 20 MEQ: 750 CAPSULE, EXTENDED RELEASE ORAL at 08:50

## 2019-01-01 RX ADMIN — DULOXETINE HYDROCHLORIDE 60 MG: 60 CAPSULE, DELAYED RELEASE ORAL at 08:53

## 2019-01-01 RX ADMIN — PREDNISONE 10 MG: 10 TABLET ORAL at 08:50

## 2019-01-01 RX ADMIN — ASPIRIN 81 MG: 81 TABLET, CHEWABLE ORAL at 09:02

## 2019-01-01 RX ADMIN — OXYCODONE HYDROCHLORIDE AND ACETAMINOPHEN 1 TABLET: 10; 325 TABLET ORAL at 14:07

## 2019-01-01 RX ADMIN — ACETAMINOPHEN 650 MG: 325 TABLET, FILM COATED ORAL at 12:19

## 2019-01-01 RX ADMIN — ASPIRIN 81 MG: 81 TABLET, CHEWABLE ORAL at 22:30

## 2019-01-01 RX ADMIN — ISOSORBIDE MONONITRATE 30 MG: 30 TABLET ORAL at 10:45

## 2019-01-01 RX ADMIN — ASPIRIN 81 MG: 81 TABLET, CHEWABLE ORAL at 08:52

## 2019-01-01 RX ADMIN — PREDNISONE 10 MG: 10 TABLET ORAL at 08:49

## 2019-01-01 RX ADMIN — POTASSIUM CHLORIDE 20 MEQ: 750 CAPSULE, EXTENDED RELEASE ORAL at 08:48

## 2019-01-01 RX ADMIN — OXYCODONE HYDROCHLORIDE AND ACETAMINOPHEN 1 TABLET: 10; 325 TABLET ORAL at 12:14

## 2019-01-01 RX ADMIN — DOXYCYCLINE 50 MG: 25 FOR SUSPENSION ORAL at 08:50

## 2019-01-01 RX ADMIN — ACETAMINOPHEN 650 MG: 325 TABLET, FILM COATED ORAL at 08:51

## 2019-01-01 RX ADMIN — ENOXAPARIN SODIUM 40 MG: 40 INJECTION SUBCUTANEOUS at 08:49

## 2019-01-01 RX ADMIN — QUETIAPINE FUMARATE 12.5 MG: 25 TABLET ORAL at 21:39

## 2019-01-01 RX ADMIN — OXYCODONE HYDROCHLORIDE AND ACETAMINOPHEN 1 TABLET: 10; 325 TABLET ORAL at 08:50

## 2019-01-01 RX ADMIN — OXYCODONE HYDROCHLORIDE AND ACETAMINOPHEN 1 TABLET: 10; 325 TABLET ORAL at 04:35

## 2019-01-01 RX ADMIN — ACETAMINOPHEN 650 MG: 325 TABLET, FILM COATED ORAL at 22:31

## 2019-01-01 RX ADMIN — NYSTATIN: 100000 POWDER TOPICAL at 10:50

## 2019-01-01 RX ADMIN — QUETIAPINE FUMARATE 12.5 MG: 25 TABLET ORAL at 08:50

## 2019-01-01 RX ADMIN — ISOSORBIDE MONONITRATE 30 MG: 30 TABLET ORAL at 08:50

## 2019-01-01 RX ADMIN — PANTOPRAZOLE SODIUM 40 MG: 40 TABLET, DELAYED RELEASE ORAL at 09:02

## 2019-01-01 RX ADMIN — SODIUM CHLORIDE, PRESERVATIVE FREE 3 ML: 5 INJECTION INTRAVENOUS at 08:57

## 2019-01-01 RX ADMIN — POTASSIUM CHLORIDE 20 MEQ: 750 CAPSULE, EXTENDED RELEASE ORAL at 18:01

## 2019-01-01 RX ADMIN — FUROSEMIDE 40 MG: 40 TABLET ORAL at 08:52

## 2019-01-01 RX ADMIN — OXYCODONE HYDROCHLORIDE AND ACETAMINOPHEN 1 TABLET: 10; 325 TABLET ORAL at 09:02

## 2019-01-01 RX ADMIN — OXYCODONE HYDROCHLORIDE AND ACETAMINOPHEN 1 TABLET: 10; 325 TABLET ORAL at 01:50

## 2019-01-01 RX ADMIN — OXYCODONE HYDROCHLORIDE AND ACETAMINOPHEN 1 TABLET: 10; 325 TABLET ORAL at 18:36

## 2019-01-01 RX ADMIN — NYSTATIN: 100000 POWDER TOPICAL at 10:00

## 2019-01-01 RX ADMIN — LEVOTHYROXINE SODIUM 100 MCG: 100 TABLET ORAL at 08:53

## 2019-01-01 RX ADMIN — FUROSEMIDE 40 MG: 40 TABLET ORAL at 08:49

## 2019-01-01 RX ADMIN — FERROUS SULFATE TAB 325 MG (65 MG ELEMENTAL FE) 325 MG: 325 (65 FE) TAB at 08:52

## 2019-01-01 RX ADMIN — DOXYCYCLINE 50 MG: 25 FOR SUSPENSION ORAL at 20:01

## 2019-01-01 RX ADMIN — SODIUM CHLORIDE, PRESERVATIVE FREE 3 ML: 5 INJECTION INTRAVENOUS at 12:59

## 2019-01-01 RX ADMIN — OXYCODONE HYDROCHLORIDE AND ACETAMINOPHEN 1 TABLET: 10; 325 TABLET ORAL at 16:58

## 2019-01-01 RX ADMIN — DULOXETINE HYDROCHLORIDE 60 MG: 60 CAPSULE, DELAYED RELEASE ORAL at 08:50

## 2019-01-01 RX ADMIN — POTASSIUM CHLORIDE 20 MEQ: 750 CAPSULE, EXTENDED RELEASE ORAL at 09:02

## 2019-01-01 RX ADMIN — QUETIAPINE FUMARATE 12.5 MG: 25 TABLET ORAL at 21:01

## 2019-01-01 RX ADMIN — OXYCODONE HYDROCHLORIDE AND ACETAMINOPHEN 1 TABLET: 10; 325 TABLET ORAL at 08:52

## 2019-01-01 RX ADMIN — ENOXAPARIN SODIUM 40 MG: 40 INJECTION SUBCUTANEOUS at 12:17

## 2019-01-01 RX ADMIN — OXYCODONE HYDROCHLORIDE AND ACETAMINOPHEN 1 TABLET: 10; 325 TABLET ORAL at 05:59

## 2019-01-01 RX ADMIN — PREDNISONE 10 MG: 10 TABLET ORAL at 12:16

## 2019-01-01 RX ADMIN — LEVOTHYROXINE SODIUM 100 MCG: 100 TABLET ORAL at 10:46

## 2019-01-01 RX ADMIN — QUETIAPINE FUMARATE 12.5 MG: 25 TABLET ORAL at 22:36

## 2019-01-01 RX ADMIN — ATORVASTATIN CALCIUM 20 MG: 20 TABLET, FILM COATED ORAL at 12:17

## 2019-01-01 RX ADMIN — POTASSIUM CHLORIDE 20 MEQ: 750 CAPSULE, EXTENDED RELEASE ORAL at 08:52

## 2019-01-01 RX ADMIN — SODIUM CHLORIDE, PRESERVATIVE FREE 3 ML: 5 INJECTION INTRAVENOUS at 09:00

## 2019-01-01 RX ADMIN — OXYCODONE HYDROCHLORIDE AND ACETAMINOPHEN 1 TABLET: 10; 325 TABLET ORAL at 22:36

## 2019-01-01 RX ADMIN — OXYCODONE HYDROCHLORIDE AND ACETAMINOPHEN 1 TABLET: 10; 325 TABLET ORAL at 22:41

## 2019-01-01 RX ADMIN — DULOXETINE HYDROCHLORIDE 60 MG: 60 CAPSULE, DELAYED RELEASE ORAL at 10:45

## 2019-01-01 RX ADMIN — SODIUM CHLORIDE, PRESERVATIVE FREE 3 ML: 5 INJECTION INTRAVENOUS at 20:01

## 2019-01-01 RX ADMIN — OXYCODONE HYDROCHLORIDE AND ACETAMINOPHEN 1 TABLET: 10; 325 TABLET ORAL at 04:25

## 2019-01-01 RX ADMIN — ATORVASTATIN CALCIUM 20 MG: 20 TABLET, FILM COATED ORAL at 08:50

## 2019-01-01 RX ADMIN — OXYCODONE HYDROCHLORIDE AND ACETAMINOPHEN 1 TABLET: 10; 325 TABLET ORAL at 21:02

## 2019-01-01 RX ADMIN — PANTOPRAZOLE SODIUM 40 MG: 40 TABLET, DELAYED RELEASE ORAL at 06:38

## 2019-01-01 RX ADMIN — DOXYCYCLINE 50 MG: 25 FOR SUSPENSION ORAL at 16:58

## 2019-01-01 RX ADMIN — SODIUM CHLORIDE, PRESERVATIVE FREE 3 ML: 5 INJECTION INTRAVENOUS at 22:37

## 2019-01-01 RX ADMIN — NYSTATIN: 100000 POWDER TOPICAL at 22:37

## 2019-01-01 RX ADMIN — POTASSIUM CHLORIDE 20 MEQ: 750 CAPSULE, EXTENDED RELEASE ORAL at 16:59

## 2019-01-01 RX ADMIN — EPINEPHRINE 0.02 MCG/KG/MIN: 1 INJECTION PARENTERAL at 14:43

## 2019-01-01 RX ADMIN — ATORVASTATIN CALCIUM 20 MG: 20 TABLET, FILM COATED ORAL at 08:49

## 2019-01-01 RX ADMIN — OXYCODONE HYDROCHLORIDE AND ACETAMINOPHEN 1 TABLET: 10; 325 TABLET ORAL at 18:22

## 2019-01-01 RX ADMIN — QUETIAPINE FUMARATE 12.5 MG: 25 TABLET ORAL at 20:01

## 2019-01-01 RX ADMIN — DOXYCYCLINE 50 MG: 25 FOR SUSPENSION ORAL at 21:02

## 2019-01-01 RX ADMIN — SODIUM CHLORIDE 1000 ML: 9 INJECTION, SOLUTION INTRAVENOUS at 13:18

## 2019-01-01 RX ADMIN — SODIUM CHLORIDE, PRESERVATIVE FREE 3 ML: 5 INJECTION INTRAVENOUS at 08:53

## 2019-01-01 RX ADMIN — QUETIAPINE FUMARATE 12.5 MG: 25 TABLET ORAL at 22:31

## 2019-01-01 RX ADMIN — POTASSIUM CHLORIDE 20 MEQ: 750 CAPSULE, EXTENDED RELEASE ORAL at 17:07

## 2019-01-01 RX ADMIN — OXYCODONE HYDROCHLORIDE AND ACETAMINOPHEN 1 TABLET: 10; 325 TABLET ORAL at 03:59

## 2019-01-01 RX ADMIN — QUETIAPINE FUMARATE 12.5 MG: 25 TABLET ORAL at 12:16

## 2019-01-01 RX ADMIN — PREDNISONE 10 MG: 10 TABLET ORAL at 22:31

## 2019-01-01 RX ADMIN — PANTOPRAZOLE SODIUM 40 MG: 40 TABLET, DELAYED RELEASE ORAL at 06:43

## 2019-01-01 RX ADMIN — DULOXETINE HYDROCHLORIDE 60 MG: 60 CAPSULE, DELAYED RELEASE ORAL at 08:52

## 2019-01-01 RX ADMIN — SODIUM CHLORIDE, PRESERVATIVE FREE 3 ML: 5 INJECTION INTRAVENOUS at 10:51

## 2019-01-01 RX ADMIN — DOXYCYCLINE 50 MG: 25 FOR SUSPENSION ORAL at 08:53

## 2019-01-01 RX ADMIN — OXYCODONE HYDROCHLORIDE AND ACETAMINOPHEN 1 TABLET: 10; 325 TABLET ORAL at 08:49

## 2019-01-07 NOTE — PATIENT INSTRUCTIONS
----------  Education about SCS therapy:    -  This was an extended office visit in which we entered into discussion about advanced pain relieving techniques, and discussed implantable pain therapies.  We discussed advanced neuromodulation in the form of Spinal Cord Stimulation.  This is a reasonable therapy for patients who have exhausted basic nonnarcotic options, basic modalities and physical therapies, and do not have any other reasonable surgical options.  This therapy as an alternative to long term high dose opioid therapy.    -  Risks include but are not limited to bleeding, infection, injury, paralysis, nerve injury, dural puncture, and risk for postprocedural pain.  Implanted equipment risks include but are not limited to lead migration, lead fracture, risk of loss of pain relieving stimulation, risk of electrical shock, and risk of system failure.    - We discussed the theory and basic science behind SCS therapy including but not limited to energy delivery and relevant anatomy, in terms that are easy to understand and also with use of illustrative devices.  Spinal Cord Stimulation therapies apply an electromagnetic field to a specific area on the spinal cord (Dorsal Column) to attempt to block transmission of painful signals from the peripheral nerves to the brain.    -  We discussed that prior to trialing, I request that patients review relevant materials and perform some research, and also have a follow up education session with a device specialist from the .  Also, insurance requires a presurgical psychological evaluation.  When these have been completed, and all the patient's questions have been answered to their satisfaction, then we will plan to request authorization for trialing.   - We discussed the trialing process (aka Phase 1)  that usually lasts a week, and the temporary nature of this trial.  Trial success will determine whether or not we proceed to implant.  We discussed  reasonable expectations, and that I feel that consistent 50% pain relief is medically successful and is a reasonable expectation to justify moving forward to permanent implant.    -  Additional risks of Phase 1 include but are not limited to bleeding on insertion, bleeding on lead removal, and procedural site soreness.  - We discussed the percutaneous surgical implant, including postsurgical restrictions, risks, and alternatives.   For spinal cord stimulation implanted device (aka SCS Phase 2) there is usually a midline vertical incision for the spinally implanted leads, and also a horizontal incision in the posterior lumbar flank for implantation of the battery & computer (aka IPG).  The leads are tunneled from the midline incision to the medial aspect of the battery pocket incision.    -  Postoperative restrictions include limiting the following activity as much as possible for 90 days:  Lifting >10 lbs, bending at the waist, stretching/reaching overhead, and twisting.  ----------      -- follow up after psychological evaluation  -- set up education session with [facility]

## 2019-01-07 NOTE — PROGRESS NOTES
CHIEF COMPLAINT  Pt states she had excellent relief for 2 weeks following 11/13/19 LTFESI and R S1 TFESI.  Pt fell 3 weeks ago,admitted to [facility] [place] for 5 days,now at [facility] generalized weakness (Pt dxd also with OA and RA).      Subjective   Saba Bonner is a 73 y.o. female  who presents to the office for follow-up of procedure.  She completed a LTFESI   on  Nov 13th as above performed by Dr. HOOKS for management of back pain and lumbar radicular pain. Patient reports significant excellent but not very long lived relief from the procedure.       Back Pain   This is a recurrent problem. The current episode started more than 1 year ago. The problem occurs constantly. The problem has been gradually worsening since onset. The pain is present in the lumbar spine. The quality of the pain is described as aching, stabbing and burning. The pain radiates to the right thigh, right knee and right foot. The pain is severe. The symptoms are aggravated by sitting and position. Associated symptoms include numbness (half of right foot is numb-increased with back pain) and weakness. Pertinent negatives include no bladder incontinence, bowel incontinence, chest pain, fever or headaches. She has tried NSAIDs and analgesics for the symptoms. The treatment provided mild relief.        PEG Assessment   What number best describes your pain on average in the past week?8  What number best describes how, during the past week, pain has interfered with your enjoyment of life?9  What number best describes how, during the past week, pain has interfered with your general activity?  8    --  The aforementioned information the Chief Complaint section and above subjective data including any HPI data has been personally reviewed and affirmed.  --        The following portions of the patient's history were reviewed and updated as appropriate: allergies, current medications, past family history, past medical history, past social history, past  surgical history and problem list.    -------    The following portions of the patient's history were reviewed and updated as appropriate: allergies, current medications, past family history, past medical history, past social history, past surgical history and problem list.    Allergies   Allergen Reactions   • Clindamycin/Lincomycin          Current Outpatient Medications:   •  acetaminophen (TYLENOL) 325 MG tablet, Take 2 tablets by mouth 4 (Four) Times a Day., Disp: , Rfl:   •  ALPRAZolam (XANAX) 0.5 MG tablet, Take 0.5 mg by mouth 2 (Two) Times a Day As Needed for Anxiety., Disp: , Rfl:   •  amLODIPine (NORVASC) 10 MG tablet, Take 10 mg by mouth Daily., Disp: , Rfl:   •  aspirin 81 MG chewable tablet, Chew 81 mg Daily., Disp: , Rfl:   •  atorvastatin (LIPITOR) 10 MG tablet, Take 20 mg by mouth Every Night., Disp: , Rfl:   •  carvedilol (COREG) 12.5 MG tablet, Take 1 tablet by mouth Every 12 (Twelve) Hours., Disp: , Rfl:   •  DULoxetine (CYMBALTA) 30 MG capsule, Take 1 capsule by mouth 2 (Two) Times a Day., Disp: , Rfl:   •  furosemide (LASIX) 40 MG tablet, Take 1 tablet by mouth Daily., Disp: , Rfl:   •  isosorbide mononitrate (IMDUR) 30 MG 24 hr tablet, Take 30 mg by mouth Daily., Disp: , Rfl:   •  levothyroxine (SYNTHROID, LEVOTHROID) 125 MCG tablet, Take 125 mcg by mouth Daily., Disp: , Rfl:   •  Multiple Vitamins-Minerals (MULTIVITAMIN ADULT PO), Take 1 tablet by mouth daily., Disp: , Rfl:   •  omeprazole (PriLOSEC) 20 MG capsule, Take 20 mg by mouth daily., Disp: , Rfl:   •  potassium chloride (K-DUR,KLOR-CON) 20 MEQ CR tablet, Take 1 tablet by mouth Daily., Disp: , Rfl:   •  predniSONE (DELTASONE) 20 MG tablet, Take 1 tablet by mouth Daily., Disp: , Rfl:   •  traZODone (DESYREL) 50 MG tablet, Take 50 mg by mouth Every Night., Disp: , Rfl:   •  warfarin (COUMADIN) 2 MG tablet, 2mg PO QD to keep INR 2-3, Disp: , Rfl:     Current Outpatient Medications on File Prior to Visit   Medication Sig Dispense Refill    • acetaminophen (TYLENOL) 325 MG tablet Take 2 tablets by mouth 4 (Four) Times a Day.     • ALPRAZolam (XANAX) 0.5 MG tablet Take 0.5 mg by mouth 2 (Two) Times a Day As Needed for Anxiety.     • amLODIPine (NORVASC) 10 MG tablet Take 10 mg by mouth Daily.     • aspirin 81 MG chewable tablet Chew 81 mg Daily.     • atorvastatin (LIPITOR) 10 MG tablet Take 20 mg by mouth Every Night.     • carvedilol (COREG) 12.5 MG tablet Take 1 tablet by mouth Every 12 (Twelve) Hours.     • DULoxetine (CYMBALTA) 30 MG capsule Take 1 capsule by mouth 2 (Two) Times a Day.     • furosemide (LASIX) 40 MG tablet Take 1 tablet by mouth Daily.     • isosorbide mononitrate (IMDUR) 30 MG 24 hr tablet Take 30 mg by mouth Daily.     • levothyroxine (SYNTHROID, LEVOTHROID) 125 MCG tablet Take 125 mcg by mouth Daily.     • Multiple Vitamins-Minerals (MULTIVITAMIN ADULT PO) Take 1 tablet by mouth daily.     • omeprazole (PriLOSEC) 20 MG capsule Take 20 mg by mouth daily.     • potassium chloride (K-DUR,KLOR-CON) 20 MEQ CR tablet Take 1 tablet by mouth Daily.     • predniSONE (DELTASONE) 20 MG tablet Take 1 tablet by mouth Daily.     • traZODone (DESYREL) 50 MG tablet Take 50 mg by mouth Every Night.     • warfarin (COUMADIN) 2 MG tablet 2mg PO QD to keep INR 2-3       No current facility-administered medications on file prior to visit.        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)   • Wheeze   • Acute kidney injury - present on admission   • Hyponatremia   • Acidosis   •  Herniation of lumbar intervertebral disc with radiculopathy   • Venous stasis   • Lumbar radiculopathy   • Anticoagulated   • MEDARDO (acute kidney injury) (CMS/HCC)   • Anemia   • RA (rheumatoid arthritis) (CMS/HCC)   • Depression   • Hiatal hernia   • Paraplegia (CMS/HCC)   • Anticoagulated on Coumadin   • HTN (hypertension)   • CKD (chronic kidney disease), stage III (CMS/HCC)   • History of pulmonary embolism       Past Medical History:   Diagnosis Date   • Coronary artery disease    • Depression    • Hiatal hernia    • Hyperlipidemia    • Hypertension    • Low back pain    • Myocardial infarction (CMS/HCC)     PT DENIES, ONLY SYMPTOM SHORTNESS OF BREATH   • Osteoarthritis    • Osteomyelitis of knee region (CMS/HCC)     left   • Pulmonary disease    • Pulmonary embolism (CMS/HCC) 2016    bilateral   • RA (rheumatoid arthritis) (CMS/HCC)    • Stroke (CMS/HCC)        Past Surgical History:   Procedure Laterality Date   • APPENDECTOMY     • CARDIAC CATHETERIZATION N/A 10/4/2016    Procedure: Left Heart Cath;  Surgeon: Kyaw Mcneil MD;  Location: [facility] INVASIVE LOCATION;  Service:    •  SECTION     • KNEE SURGERY     • KYPHOPLASTY  2014    L3 kyphoplasty- Dr. Brush   • LUMBAR FUSION     • LUMBAR LAMINECTOMY DISCECTOMY DECOMPRESSION  2015       Family History   Problem Relation Age of Onset   • Other Other         CABG   • Heart disease Mother    • Hypertension Mother    • Heart attack Mother    • Heart disease Father    • Hypertension Father    • Heart attack Father    • No Known Problems Brother    • Colon cancer Maternal Grandmother    • No Known Problems Maternal Grandfather    • No Known Problems Paternal Grandmother    • No Known Problems Paternal Grandfather        Social History     Socioeconomic History   • Marital status:      Spouse name: Not on file   • Number of children: 2   • Years of education: MD   • Highest education level: Not on file   Social Needs   • Financial  resource strain: Not on file   • Food insecurity - worry: Not on file   • Food insecurity - inability: Not on file   • Transportation needs - medical: Not on file   • Transportation needs - non-medical: Not on file   Occupational History   • Occupation: RETIRED   Tobacco Use   • Smoking status: Never Smoker   • Smokeless tobacco: Never Used   Substance and Sexual Activity   • Alcohol use: Yes     Alcohol/week: 3.0 oz     Types: 5 Glasses of wine per week     Comment: Social use   • Drug use: No   • Sexual activity: Defer   Other Topics Concern   • Not on file   Social History Narrative    INDEPENDENT LIVING       -------        Review of Systems   Constitutional: Positive for activity change and fatigue. Negative for chills and fever.   HENT: Negative for ear pain.    Eyes: Negative for pain.   Respiratory: Positive for apnea (borderline, does not wear machine pt states). Negative for cough, shortness of breath and wheezing.    Cardiovascular: Positive for leg swelling (pt has venous insufficency and legs are wrapped in ace wrap). Negative for chest pain.   Gastrointestinal: Negative for bowel incontinence, constipation, diarrhea, nausea and vomiting.   Genitourinary: Positive for difficulty urinating (incontinence) and frequency (noticed in last week or so). Negative for bladder incontinence.   Musculoskeletal: Positive for back pain and gait problem (pt is in motorized chair and uses wheelchair).   Skin: Negative for rash.   Allergic/Immunologic: Positive for immunocompromised state (chronic steroid use).   Neurological: Positive for weakness and numbness (half of right foot is numb-increased with back pain). Negative for dizziness, light-headedness and headaches.   Hematological: Bruises/bleeds easily.   Psychiatric/Behavioral: Positive for sleep disturbance. Negative for agitation, confusion, hallucinations and suicidal ideas. The patient is not nervous/anxious.        Vitals:    01/07/19 0930   BP: 142/71  "  Pulse: 75   Resp: 16   Temp: 98.1 °F (36.7 °C)   SpO2: 95%   Weight: 86.2 kg (190 lb)   Height: 152.4 cm (60\")   PainSc:   7   PainLoc: Back  Comment: R sided LBP ranges from 4-8/10         Objective   Physical Exam   Constitutional: She is oriented to person, place, and time. She appears well-developed and well-nourished.   HENT:   Head: Normocephalic and atraumatic.   Eyes: Conjunctivae and EOM are normal.   Neck: Neck supple.   Cardiovascular: Normal rate.   Pulmonary/Chest: Effort normal. No respiratory distress.   Abdominal:   Obese    Musculoskeletal:        Left knee: She exhibits decreased range of motion and swelling.        Lumbar back: She exhibits decreased range of motion, tenderness, bony tenderness and pain.   +Right jay is equivocal today   Neurological: She is alert and oriented to person, place, and time. A sensory deficit (bilateral feet) is present. Gait (wheelchair) abnormal.   SLR pos right     Skin: Skin is warm and dry.   Psychiatric: She has a normal mood and affect. Her speech is normal and behavior is normal. Judgment and thought content normal. Cognition and memory are normal.   Nursing note and vitals reviewed.          Assessment/Plan   Saba was seen today for back pain.    Diagnoses and all orders for this visit:    Spinal stenosis of lumbar region with neurogenic claudication  -     Ambulatory Referral to Psychology    Chronic pain syndrome    Lumbar radiculopathy    Anticoagulated on Coumadin      This was a quite extended office visit of 34 minutes in duration and we spent 20 minutes in education and counseling as below.  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  She is interested in what other options could be considered.  She expresses a need to consider other options as she is having ever decreasing QOL.  We discussed risks/benefits of SCS therapy including but not limited to " infection risks and bleeding risks, and also discussed anticoagulation management.      We discussed alternative therapy of oral narcotic escalation indefinitely and the risks therein.      SCS is indicated for both nonoperative back pain with radicular elements as well as for pain associated with peripheral vascular disease.       -- given some materials regarding Spinal cord stimulation   -- follow up after psychological evaluation  -- set up education session with [facility]    -- if she proceeds to SCS trialing, she will need to have an INR or <1.5 to initiate the trial and be bridged during the duration of the trial likely on Lovenox but could consider Aspirin instead if the prescribing physician feels this reasonable for the 3-5 days of the indwelling leads for the trial.      -- if a trial is successful then I feel the next step would likely need to be SCS Phase 2 with Dr. Brush.         ----------  Education about SCS therapy:    -  This was an extended office visit in which we entered into discussion about advanced pain relieving techniques, and discussed implantable pain therapies.  We discussed advanced neuromodulation in the form of Spinal Cord Stimulation.  This is a reasonable therapy for patients who have exhausted basic nonnarcotic options, basic modalities and physical therapies, and do not have any other reasonable surgical options.  This therapy as an alternative to long term high dose opioid therapy.    -  Risks include but are not limited to bleeding, infection, injury, paralysis, nerve injury, dural puncture, and risk for postprocedural pain.  Implanted equipment risks include but are not limited to lead migration, lead fracture, risk of loss of pain relieving stimulation, risk of electrical shock, and risk of system failure.    - We discussed the theory and basic science behind SCS therapy including but not limited to energy delivery and relevant anatomy, in terms that are easy to  understand and also with use of illustrative devices.  Spinal Cord Stimulation therapies apply an electromagnetic field to a specific area on the spinal cord (Dorsal Column) to attempt to block transmission of painful signals from the peripheral nerves to the brain.    -  We discussed that prior to trialing, I request that patients review relevant materials and perform some research, and also have a follow up education session with a device specialist from the .  Also, insurance requires a presurgical psychological evaluation.  When these have been completed, and all the patient's questions have been answered to their satisfaction, then we will plan to request authorization for trialing.   - We discussed the trialing process (aka Phase 1)  that usually lasts a week, and the temporary nature of this trial.  Trial success will determine whether or not we proceed to implant.  We discussed reasonable expectations, and that I feel that consistent 50% pain relief is medically successful and is a reasonable expectation to justify moving forward to permanent implant.    -  Additional risks of Phase 1 include but are not limited to bleeding on insertion, bleeding on lead removal, and procedural site soreness.  - We discussed the percutaneous surgical implant, including postsurgical restrictions, risks, and alternatives.   For spinal cord stimulation implanted device (aka SCS Phase 2) there is usually a midline vertical incision for the spinally implanted leads, and also a horizontal incision in the posterior lumbar flank for implantation of the battery & computer (aka IPG).  The leads are tunneled from the midline incision to the medial aspect of the battery pocket incision.    -  Postoperative restrictions include limiting the following activity as much as possible for 90 days:  Lifting >10 lbs, bending at the waist, stretching/reaching overhead, and twisting.  ----------    --------    Anticoagulation risks for  procedures....   - there are risks to discontinuation of anticoagulants for neuraxial procedures and surgery.  Risks include but are not limited to deep vein thromboses, pulmonary emboli, myocardial infarction, and stroke    - Neuraxial access with a needle is relatively contraindicated while anticoagulated because of the risk of hemorrhage and/or epidural hematoma, which can be a neurosurgical emergency to evacuate bleeding.  Left unchecked, bleeding can cause nerve damage leading to paralysis and/or death.  --------            EMR Dragon/Transcription disclaimer:   Much of this encounter note is an electronic transcription/translation of spoken language to printed text. The electronic translation of spoken language may permit erroneous, or at times, nonsensical words or phrases to be inadvertently transcribed; Although I have reviewed the note for such errors, some may still exist.

## 2019-01-14 NOTE — PROGRESS NOTES
Subjective   Saba Bonner is a 73 y.o. female. Patient is here today for   Chief Complaint   Patient presents with   • Establish Care     NP          Vitals:    01/14/19 1002   BP: 104/52   Pulse: 72   Resp: 16   SpO2: 95%       Past Medical History:   Diagnosis Date   • Coronary artery disease    • Deep vein thrombosis (CMS/HCC)    • Depression    • Hiatal hernia    • Hyperlipidemia    • Hypertension    • Low back pain    • Myocardial infarction (CMS/HCC) 2013    PT DENIES, ONLY SYMPTOM SHORTNESS OF BREATH   • Osteoarthritis    • Osteomyelitis of knee region (CMS/HCC)     left   • Pulmonary disease    • Pulmonary embolism (CMS/HCC) 2016    bilateral   • RA (rheumatoid arthritis) (CMS/HCC)    • Stroke (CMS/HCC)       Allergies   Allergen Reactions   • Clindamycin/Lincomycin       Social History     Socioeconomic History   • Marital status:      Spouse name: Not on file   • Number of children: 2   • Years of education: MD   • Highest education level: Not on file   Social Needs   • Financial resource strain: Not on file   • Food insecurity - worry: Not on file   • Food insecurity - inability: Not on file   • Transportation needs - medical: Not on file   • Transportation needs - non-medical: Not on file   Occupational History   • Occupation: RETIRED   Tobacco Use   • Smoking status: Never Smoker   • Smokeless tobacco: Never Used   Substance and Sexual Activity   • Alcohol use: Yes     Alcohol/week: 3.0 oz     Types: 5 Glasses of wine per week     Comment: Social use   • Drug use: No   • Sexual activity: Defer   Other Topics Concern   • Not on file   Social History Narrative    INDEPENDENT LIVING        Current Outpatient Medications:   •  acetaminophen (TYLENOL) 325 MG tablet, Take 2 tablets by mouth 4 (Four) Times a Day., Disp: , Rfl:   •  ALPRAZolam (XANAX) 0.5 MG tablet, Take 1 tablet by mouth 2 (Two) Times a Day As Needed for Anxiety., Disp: 60 tablet, Rfl: 2  •  amLODIPine (NORVASC) 10 MG tablet, Take 10  mg by mouth Daily., Disp: , Rfl:   •  aspirin 81 MG chewable tablet, Chew 81 mg Daily., Disp: , Rfl:   •  atorvastatin (LIPITOR) 10 MG tablet, Take 20 mg by mouth Every Night., Disp: , Rfl:   •  carvedilol (COREG) 12.5 MG tablet, Take 1 tablet by mouth Every 12 (Twelve) Hours., Disp: , Rfl:   •  DULoxetine (CYMBALTA) 30 MG capsule, Take 1 capsule by mouth 2 (Two) Times a Day., Disp: , Rfl:   •  furosemide (LASIX) 40 MG tablet, Take 1 tablet by mouth Daily., Disp: , Rfl:   •  isosorbide mononitrate (IMDUR) 30 MG 24 hr tablet, Take 30 mg by mouth Daily., Disp: , Rfl:   •  levothyroxine (SYNTHROID, LEVOTHROID) 125 MCG tablet, Take 125 mcg by mouth Daily., Disp: , Rfl:   •  Multiple Vitamins-Minerals (MULTIVITAMIN ADULT PO), Take 1 tablet by mouth daily., Disp: , Rfl:   •  omeprazole (PriLOSEC) 20 MG capsule, Take 20 mg by mouth daily., Disp: , Rfl:   •  potassium chloride (K-DUR,KLOR-CON) 20 MEQ CR tablet, Take 1 tablet by mouth Daily., Disp: , Rfl:   •  predniSONE (DELTASONE) 20 MG tablet, Take 1 tablet by mouth Daily., Disp: , Rfl:   •  traZODone (DESYREL) 50 MG tablet, Take 50 mg by mouth Every Night., Disp: , Rfl:   •  warfarin (COUMADIN) 2 MG tablet, 2mg PO QD to keep INR 2-3, Disp: , Rfl:   •  oxyCODONE-acetaminophen (PERCOCET)  MG per tablet, Take 1 tablet by mouth Every 4 (Four) Hours As Needed for Moderate Pain ., Disp: 180 tablet, Rfl: 0     Objective     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  She lives at [facility].    She gets around with the use of electric wheelchair.    She does not ambulate on her own.    She has a past medical history significant for non-ST elevated myocardial infarction, coronary artery disease, hypertension,    She has a history of bilateral pulmonary emboli.  She had been on warfarin for a few years.  Recently, her warfarin was discontinued and she began taking Eliquis.    She has rheumatoid arthritis and is followed by Dr. Hernadez.          Review of Systems   Constitutional: Negative.    HENT: Negative.    Respiratory: Negative.    Cardiovascular: Negative.    Musculoskeletal: Negative.    Psychiatric/Behavioral: Negative.        Physical Exam   Constitutional: She is oriented to person, place, and time. She appears well-developed and well-nourished.   Pleasant, neatly groomed, BMI 37.   HENT:   Head: Normocephalic and atraumatic.   Right Ear: External ear normal.   Cardiovascular: Normal rate, regular rhythm, normal heart sounds and intact distal pulses. Exam reveals no friction rub.   No murmur heard.  Pulmonary/Chest: Effort normal and breath sounds normal. No stridor. No respiratory distress.   Musculoskeletal:   Her lower calves are wrapped in compression bandages.  She has chronic edema bilateral lower extremities.  She has venous stasis changes.       Neurological: She is alert and oriented to person, place, and time.   Psychiatric: She has a normal mood and affect. Her behavior is normal. Thought content normal.   Nursing note and vitals reviewed.        Problem List Items Addressed This Visit        Cardiovascular and Mediastinum    Hypertension    Relevant Orders    Comprehensive Metabolic Panel    CBC & Differential    Venous stasis - Primary       Musculoskeletal and Integument    RA (rheumatoid arthritis) (CMS/[facility])       Genitourinary    CKD (chronic kidney disease), stage III (CMS/[facility])       Hematopoietic and Hemostatic    Anemia    Relevant Orders    CBC & Differential    Anticoagulated on Coumadin      Other Visit Diagnoses     Hypercholesterolemia        Relevant Orders    Lipid Panel With LDL / HDL Ratio    Hypothyroidism, unspecified type        Relevant Orders    TSH    T4    Hyperglycemia        Relevant Orders    Hemoglobin A1c            PLAN  She and I reviewed her chart.  She requested refill on oxycodone/acetaminophen 10/325 which she takes 6 times a day.  I did refill this medication for her today.    I asked her to  follow-up with me in about 3 months.  A week before that visit, I would like her to get following labs done: Comprehensive metabolic panel, CBC, lipid profile, TSH and free T4,      No Follow-up on file.

## 2019-02-28 PROBLEM — N95.0 POSTMENOPAUSAL VAGINAL BLEEDING: Status: ACTIVE | Noted: 2019-01-01

## 2019-02-28 PROBLEM — R01.1 MURMUR, CARDIAC: Status: ACTIVE | Noted: 2019-01-01

## 2019-03-01 PROBLEM — I44.30 AV HEART BLOCK: Status: ACTIVE | Noted: 2019-01-01

## 2019-03-01 PROBLEM — I44.2 THIRD DEGREE AV BLOCK (HCC): Status: ACTIVE | Noted: 2019-01-01

## 2019-03-01 NOTE — PROGRESS NOTES
OB/GYN Progress Note    Patient doing ok, denies any pain. Reports her vaginal bleeding is improved. She was transfused yesterday, Hgb 9.2.     Vitals:    03/01/19 0726   BP: 137/60   Pulse:    Resp: 18   Temp: 98 °F (36.7 °C)   SpO2: 98%     Physical Exam:  Gen: NAD, alert, oriented  Abd: obese, soft, NT, ND  : deferred, no blood visible on perineum, no blood on pad     A/P: 73 yo female w/ PMB  1) PMB   -no known etiology at this time, US normal, pap and pelvic exam performed yesterday,  awaiting pap results   - no bleeding currently   - anticoagulants discontinued at this time   - discussed possible causes of bleeding including cervical polyp, cervical dysplasia,  endometrial atrophy, endometrial malignancy   - discussed waiting for Pap results because if she needs to go to OR for further evaluation, we would know if we need cervical biopsies in addition to hysteroscopy, D&C. I spoke to pathology dept here and they sent specimen to [facility] this morning.   - discussed starting PO progesterone, would see if ok with her general medicine team, would not start at this time since bleeding has resolved currently    Velvet Sage MD

## 2019-03-01 NOTE — CONSULTS
GYN Consult H&P    Patient Identification:  Name: Saba Bonner  Age: 74 y.o.  Sex: female  :  1945  MRN: [id number]       2019   8:38 PM              Subjective:  Saba Bonner is a 74 y.o.,  s/p  x 1 and C/S x 1, female we are asked to see in consult for vaginal bleeding and anemia. She states she has had this for a couple days and decided to present to the hospital when she saw some small clots. She has not had bleeding like this before. She has not seen a Gynecologist or had a Pap/pelvic exam in 15 or more years though she does recall possibly having an abnormal Pap smear and a colposcopy with cervical biopsy some years prior to her last Gyn visit. She was transitioned from coumadin to Eliquis about 5wks ago. She is prone to yeast infections and not sure if she currently has this or other SiSx vaginal infection. She does not believe the bleeding is coming from the bladder and cath UA in the ER showed small (1+) blood but only 3-5 RBCs. Her limited abdominal USG showed normal uterus with endometrial stripe 0.3cm, ovaries not see and no free fluid.      Past Medical History:   Diagnosis Date   • Coronary artery disease    • Deep vein thrombosis (CMS/HCC)    • Depression    • Hiatal hernia    • Hyperlipidemia    • Hypertension    • Hypothyroid    • Low back pain    • Myocardial infarction (CMS/HCC)     PT DENIES, ONLY SYMPTOM SHORTNESS OF BREATH   • Osteoarthritis    • Osteomyelitis of knee region (CMS/HCC)     left   • Pulmonary disease    • Pulmonary embolism (CMS/HCC) 2016    bilateral   • RA (rheumatoid arthritis) (CMS/HCC)    • Stroke (CMS/HCC)      Past Surgical History:   Procedure Laterality Date   • APPENDECTOMY     • CARDIAC CATHETERIZATION N/A 10/4/2016    Procedure: Left Heart Cath;  Surgeon: Kyaw Mcneil MD;  Location: [facility] INVASIVE LOCATION;  Service:    •  SECTION     • KNEE SURGERY     • KYPHOPLASTY  2014    L3 kyphoplasty- Dr. Brush   • LUMBAR  FUSION  2015   • LUMBAR LAMINECTOMY DISCECTOMY DECOMPRESSION  2015     OB History   No data available     Allergies   Allergen Reactions   • Clindamycin/Lincomycin        Objective:  Vital signs:  Temp:  [97.5 °F (36.4 °C)-98.3 °F (36.8 °C)] 98.3 °F (36.8 °C)  Heart Rate:  [49-76] 59  Resp:  [18] 18  BP: (108-152)/(49-94) 108/54    General:   alert, appears stated age and cooperative  Skin:   normal  HEENT:  PERRLA  Abdomen:  soft, non-tender; bowel sounds normal; no masses,  no organomegaly  Pelvis:  Vulva appears normal, bilateral labia without lesion, urethral meatus slightly ecchymotic but no e/o bleeding (suspect due to trauma from cath or spec exam per PA in ED), small dark clots at intriotus and small amount bright red bleeding along vaginal canal, cervix very posterior and superior edge barely seen with narrow Rossi speculum (all she could tolerate), some increased bleeding noted after Pap smear collected, on BME again cervix very posterior, superior aspect felt and somewhat irregular but not fixed or friable feeling, cervix was somehwat mobile, exam limited by habitus, paraplegia and significant immobility in left leg and in bed on bedpan    Lab Review  Recent Results (from the past 24 hour(s))   Protime-INR    Collection Time: 02/28/19 12:16 PM   Result Value Ref Range    Protime 16.8 (H) 11.7 - 14.2 Seconds    INR 1.39 (H) 0.90 - 1.10   Type & Screen    Collection Time: 02/28/19 12:16 PM   Result Value Ref Range    ABO Type O     RH type Positive     Antibody Screen Negative     T&S Expiration Date 3/3/2019 11:59:59 PM    CBC Auto Differential    Collection Time: 02/28/19 12:16 PM   Result Value Ref Range    WBC 11.06 (H) 3.40 - 10.80 10*3/mm3    RBC 3.10 (L) 3.77 - 5.28 10*6/mm3    Hemoglobin 8.1 (L) 12.0 - 15.9 g/dL    Hematocrit 28.3 (L) 34.0 - 46.6 %    MCV 91.3 79.0 - 97.0 fL    MCH 26.1 (L) 26.6 - 33.0 pg    MCHC 28.6 (L) 31.5 - 35.7 g/dL    RDW 15.5 (H) 12.3 - 15.4 %    RDW-SD 51.2 37.0 - 54.0  fl    MPV 9.9 6.0 - 12.0 fL    Platelets 315 140 - 450 10*3/mm3    Neutrophil % 74.1 42.7 - 76.0 %    Lymphocyte % 15.0 (L) 19.6 - 45.3 %    Monocyte % 7.9 5.0 - 12.0 %    Eosinophil % 1.6 0.3 - 6.2 %    Basophil % 0.5 0.0 - 1.5 %    Immature Grans % 0.9 (H) 0.0 - 0.5 %    Neutrophils, Absolute 8.19 (H) 1.40 - 7.00 10*3/mm3    Lymphocytes, Absolute 1.66 0.70 - 3.10 10*3/mm3    Monocytes, Absolute 0.87 0.10 - 0.90 10*3/mm3    Eosinophils, Absolute 0.18 0.00 - 0.40 10*3/mm3    Basophils, Absolute 0.06 0.00 - 0.20 10*3/mm3    Immature Grans, Absolute 0.10 (H) 0.00 - 0.05 10*3/mm3    nRBC 0.0 0.0 - 0.0 /100 WBC   Reticulocytes    Collection Time: 02/28/19 12:16 PM   Result Value Ref Range    Reticulocyte % 2.96 (H) 0.50 - 1.50 %   Urinalysis With Microscopic If Indicated (No Culture) - Urine, Catheter    Collection Time: 02/28/19 12:31 PM   Result Value Ref Range    Color, UA Yellow Yellow, Straw    Appearance, UA Clear Clear    pH, UA 7.5 5.0 - 8.0    Specific Gravity, UA 1.008 1.005 - 1.030    Glucose, UA Negative Negative    Ketones, UA Negative Negative    Bilirubin, UA Negative Negative    Blood, UA Small (1+) (A) Negative    Protein, UA Negative Negative    Leuk Esterase, UA Small (1+) (A) Negative    Nitrite, UA Negative Negative    Urobilinogen, UA 0.2 E.U./dL 0.2 - 1.0 E.U./dL   Urinalysis, Microscopic Only - Urine, Catheter    Collection Time: 02/28/19 12:31 PM   Result Value Ref Range    RBC, UA 3-5 (A) None Seen, 0-2 /HPF    WBC, UA 6-12 (A) None Seen, 0-2 /HPF    Bacteria, UA None Seen None Seen /HPF    Squamous Epithelial Cells, UA 0-2 None Seen, 0-2 /HPF    Hyaline Casts, UA None Seen None Seen /LPF    Methodology Automated Microscopy    Ferritin    Collection Time: 02/28/19  6:12 PM   Result Value Ref Range    Ferritin 63.20 13.00 - 150.00 ng/mL   Iron Profile    Collection Time: 02/28/19  6:12 PM   Result Value Ref Range    Iron 21 (L) 37 - 145 mcg/dL    Iron Saturation 6 (L) 20 - 50 %    Transferrin  232 200 - 360 mg/dL    TIBC 346 mcg/dL   Comprehensive Metabolic Panel    Collection Time: 02/28/19  6:12 PM   Result Value Ref Range    Glucose 222 (H) 65 - 99 mg/dL    BUN 17 8 - 23 mg/dL    Creatinine 1.38 (H) 0.57 - 1.00 mg/dL    Sodium 142 136 - 145 mmol/L    Potassium 3.2 (L) 3.5 - 5.2 mmol/L    Chloride 101 98 - 107 mmol/L    CO2 26.4 22.0 - 29.0 mmol/L    Calcium 9.5 8.6 - 10.5 mg/dL    Total Protein 6.0 6.0 - 8.5 g/dL    Albumin 3.70 3.50 - 5.20 g/dL    ALT (SGPT) 14 1 - 33 U/L    AST (SGOT) 11 1 - 32 U/L    Alkaline Phosphatase 95 39 - 117 U/L    Total Bilirubin 0.2 0.1 - 1.2 mg/dL    eGFR Non African Amer 38 (L) >60 mL/min/1.73    Globulin 2.3 gm/dL    A/G Ratio 1.6 g/dL    BUN/Creatinine Ratio 12.3 7.0 - 25.0    Anion Gap 14.6 mmol/L   T4, Free    Collection Time: 02/28/19  6:12 PM   Result Value Ref Range    Free T4 1.61 0.93 - 1.70 ng/dL   TSH    Collection Time: 02/28/19  6:12 PM   Result Value Ref Range    TSH 0.181 (L) 0.270 - 4.200 mIU/mL   Troponin    Collection Time: 02/28/19  6:12 PM   Result Value Ref Range    Troponin T 0.020 0.000 - 0.030 ng/mL   Prepare RBC, 1 Units    Collection Time: 02/28/19  7:26 PM   Result Value Ref Range    Product Code W8960G78     Unit Number [id number]     UNIT  ABO O     UNIT  RH POS     Dispense Status XM     Blood Type [facility]     Blood Expiration Date [id number]     Blood Type Barcode 5100         COMPLETE PELVIC SONOGRAM     HISTORY: 74-year-old female with a history of vaginal bleeding and  passing clots.     FINDINGS: Transabdominal and transvaginal pelvic sonography was  performed. The examination is limited due to patient's body habitus and  inability to get into the    position for the examination.     The uterus measures 6.3 x 1.7 x 2.9 cm and is anteflexed. The  endometrial bilayer measures 0.3 cm in thickness. No abnormality of the  uterus is appreciated.     Neither ovary is visualized. No free fluid is seen within the pelvic  cul-de-sac.       Impression:     Limited pelvic sonogram. However, the uterus has a normal  appearance. Neither ovary is visualized. No abnormality of the pelvis is  appreciated.         Assessment/Plan:  74 y.o.  postmenopausal female with multiple medical conditions on anticoagulants with new onset vaginal bleeding and anemia and no Gyn care in many years with possible history of abnormal Pap smear. Limited pelvic USG normal. Exam also limited but attempt to obtain a Pap smear to be processed STAT. No further evaluation possible due to pt's status and doing exam in bed with small speculum. Disc with pt will attempt to get a STAT read on the Pap smear. Disc due to difficult exam the Pap could be inadequate. Disc even though USG normal it was limited and she may warrant further evaluation of the endometrium. Disc the only way to potentially do an endometrial biopsy would be in the OR, disc even doing this in the office would be challenging due to her condition. Disc if going to the OR then may be prudent to simply proceed with diagnostic hysteroscopy and D&C as well as possible repeat Pap smear with or without cervical biopsy. Vaginal culture collected and will be processed through my office, [facility]. Will continue to follow patient and determine plan for further evaluation and management.         Lena Nazario MD  2019   8:38 PM

## 2019-03-01 NOTE — PLAN OF CARE
Problem: Fall Risk (Adult)  Goal: Identify Related Risk Factors and Signs and Symptoms  Outcome: Outcome(s) achieved Date Met: 03/01/19    Goal: Absence of Fall  Outcome: Ongoing (interventions implemented as appropriate)      Problem: Patient Care Overview  Goal: Plan of Care Review  Outcome: Ongoing (interventions implemented as appropriate)   03/01/19 0502   Coping/Psychosocial   Plan of Care Reviewed With patient   Plan of Care Review   Progress no change   OTHER   Outcome Summary Pt VSS, pt Hgb 8.1, 1 unit of PRBC transfused, cardio consulted, GYN MD in to see patient, pt up to chair for part of shift, vaginal bleeding monitored, pt c/o back pain (chronic per pt), MD made aware and heating pad ordered, pt HR down to 35bpm at times, MD made aware, pt on 1L of O2 this pm, per Dr. Monique no anesthesia, will continue to monitor.      Goal: Individualization and Mutuality  Outcome: Ongoing (interventions implemented as appropriate)    Goal: Discharge Needs Assessment  Outcome: Ongoing (interventions implemented as appropriate)    Goal: Interprofessional Rounds/Family Conf  Outcome: Ongoing (interventions implemented as appropriate)      Problem: Skin Injury Risk (Adult)  Goal: Identify Related Risk Factors and Signs and Symptoms  Outcome: Ongoing (interventions implemented as appropriate)    Goal: Skin Health and Integrity  Outcome: Ongoing (interventions implemented as appropriate)

## 2019-03-01 NOTE — CONSULTS
Electrophysiology Hospital Consult            Patient Name: Saba Bonner  Age/Sex: 74 y.o. female  : 1945  MRN: [id number]    Date of Admission: 2019  Date of Encounter Visit: 19  Encounter Provider: SERGIO Valdivia  Referring Provider: Aron Monique MD  Place of Service: [facility] CARDIOLOGY  Patient Care Team:  Supa Otero MD as PCP - General (Internal Medicine)  Dane Farrell PA as PCP - Claims Attributed  Tico Brush MD as Surgeon (Neurosurgery)  Jonah Hernadez MD as Consulting Physician (Rheumatology)  Asael Crawford MD (Infectious Diseases)      Subjective:   EP Consultation for: High Grade AV block    Chief Complaint: weakness and dizziness x 1 month    History of Present Illness:  Saba Bonner is a 74 y.o. 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 She was hospitalized with sepsis following total right knee replacement with infection in May 2016. She did have intermittent AV block and torsades requiring defibrillation during this admission. She also required temporary pacemaker which was removed when her rhythm stabilized.     She has had multiple infections of the knee and was followed by infectious disease at [facility]. She is to remain on lifelong doxycycline per their recommendations. She has not followed up since May 2018.     She has also been followed by Dr. Hernández at [facility] and was last seen by him in 2018 for preoperative clearance.     We've been asked to see for high grade AV block. She states that she has been getting progressively more short of breath over the last month. She also states that she has had episodes of dizziness when standing after urination during that time. With these  episodes her legs become weak and she has fallen back down to the toilet. She denies previous injury with these episodes, however, she is concerned about falling at home because she does live alone.     She was on carvedilol at home that has been held since admission on .     Past Medical History:  Past Medical History:   Diagnosis Date   • Coronary artery disease    • Deep vein thrombosis (CMS/HCC)    • Depression    • Hiatal hernia    • Hyperlipidemia    • Hypertension    • Hypothyroid    • Low back pain    • Myocardial infarction (CMS/HCC)     PT DENIES, ONLY SYMPTOM SHORTNESS OF BREATH   • Osteoarthritis    • Osteomyelitis of knee region (CMS/HCC)     left   • Pulmonary disease    • Pulmonary embolism (CMS/HCC) 2016    bilateral   • RA (rheumatoid arthritis) (CMS/HCC)    • Stroke (CMS/HCC)        Past Surgical History:   Procedure Laterality Date   • APPENDECTOMY     • CARDIAC CATHETERIZATION N/A 10/4/2016    Procedure: Left Heart Cath;  Surgeon: Kyaw Mcneil MD;  Location: [facility] CATH INVASIVE LOCATION;  Service:    •  SECTION     • KNEE SURGERY     • KYPHOPLASTY  2014    L3 kyphoplasty- Dr. Brush   • LUMBAR FUSION     • LUMBAR LAMINECTOMY DISCECTOMY DECOMPRESSION  2015       Home Medications:   Medications Prior to Admission   Medication Sig Dispense Refill Last Dose   • ALPRAZolam (XANAX) 0.5 MG tablet Take 1 tablet by mouth 2 (Two) Times a Day As Needed for Anxiety. 60 tablet 2    • amLODIPine (NORVASC) 10 MG tablet Take 1 tablet by mouth Daily. 30 tablet 2    • apixaban (ELIQUIS) 5 MG tablet tablet Take 5 mg by mouth 2 (Two) Times a Day.      • aspirin 81 MG chewable tablet Chew 81 mg Daily.   Taking   • atorvastatin (LIPITOR) 20 MG tablet Take 20 mg by mouth Daily.      • carvedilol (COREG) 12.5 MG tablet Take 12.5 mg by mouth 2 (Two) Times a Day With Meals.      • DULoxetine (CYMBALTA) 60 MG capsule Take 60 mg by mouth 2 (Two) Times a Day.      • furosemide (LASIX) 40 MG  tablet Take 40 mg by mouth Daily.      • isosorbide mononitrate (IMDUR) 30 MG 24 hr tablet Take 1 tablet by mouth Daily. 30 tablet 3    • levothyroxine (SYNTHROID, LEVOTHROID) 125 MCG tablet Take 125 mcg by mouth Daily.   Taking   • omeprazole (PriLOSEC) 20 MG capsule Take 20 mg by mouth daily.   Taking   • oxyCODONE-acetaminophen (PERCOCET)  MG per tablet Take 1 tablet by mouth Every 4 (Four) Hours As Needed for Moderate Pain . 180 tablet 0    • potassium chloride (KLOR-CON) 20 MEQ CR tablet Take 20 mEq by mouth 2 (Two) Times a Day.      • predniSONE (DELTASONE) 10 MG tablet Take 10 mg by mouth Daily.      • QUEtiapine (SEROquel) 25 MG tablet Take 12.5 mg by mouth 2 (Two) Times a Day.      • traZODone (DESYREL) 50 MG tablet Take 50 mg by mouth At Night As Needed for Sleep.          Allergies:  Allergies   Allergen Reactions   • Clindamycin/Lincomycin        Past Social History:  Social History     Socioeconomic History   • Marital status:      Spouse name: Not on file   • Number of children: 2   • Years of education: MD   • Highest education level: Not on file   Social Needs   • Financial resource strain: Not on file   • Food insecurity - worry: Not on file   • Food insecurity - inability: Not on file   • Transportation needs - medical: Not on file   • Transportation needs - non-medical: Not on file   Occupational History   • Occupation: RETIRED   Tobacco Use   • Smoking status: Never Smoker   • Smokeless tobacco: Never Used   Substance and Sexual Activity   • Alcohol use: Yes     Alcohol/week: 3.0 oz     Types: 5 Glasses of wine per week     Comment: Social use   • Drug use: No   • Sexual activity: Defer   Other Topics Concern   • Not on file   Social History Narrative    INDEPENDENT LIVING       Past Family History:  Family History   Problem Relation Age of Onset   • Other Other         CABG   • Heart disease Mother    • Hypertension Mother    • Heart attack Mother    • Heart disease Father    •  Hypertension Father    • Heart attack Father    • No Known Problems Brother    • Colon cancer Maternal Grandmother    • No Known Problems Maternal Grandfather    • No Known Problems Paternal Grandmother    • No Known Problems Paternal Grandfather        Review of Systems: All systems reviewed. Pertinent positives identified in HPI. All other systems are negative.     14 point ROS was performed and is negative except as outlined in HPI.     Objective:     Objective:  Vital Signs (last 24 hours)       02/28 0700  -  03/01 0659 03/01 0700  -  03/01 1650   Most Recent    Temp (°F) 97.5 -  98.4      98     --  Comment: off unit    Heart Rate (!)48 -  76    (!)39 -  (!)44     (!) 44    Resp 18 -  20      18     18    /54 -  152/68    137/60 -  140/64     140/64    SpO2 (%) 91 -  96    92 -  98     92        Temp:  [97.5 °F (36.4 °C)-98.4 °F (36.9 °C)] 98 °F (36.7 °C)  Heart Rate:  [39-72] 44  Resp:  [18-20] 18  BP: (108-144)/(51-83) 140/64  Body mass index is 32.78 kg/m².        Physical Exam:     General Appearance: No acute distress.  Eyes: Conjunctiva and lids: No erythema, swelling, or discharge. Sclera non-icteric.   HENT: Atraumatic, normocephalic. External eyes, ears, and nose normal.   Respiratory: No signs of respiratory distress. Respiration rhythm and depth normal.   Clear to auscultation. No rales, crackles, rhonchi, or wheezing auscultated.   Cardiovascular:  Heart Rate and Rhythm: Regular/ bradycardic. Heart Sounds: Normal S1 and S2. No S3 or S4 noted.  Murmurs: No murmurs noted. No rubs, thrills, or gallops.   Lower Extremities: No edema noted.  Gastrointestinal:  Abdomen soft, non-distended, non-tender.  Musculoskeletal: Moves all extremities  Skin: Warm and dry.   Psychiatric: Patient alert and oriented to person, place, and time. Speech and behavior appropriate. Normal mood and affect.    Labs:   Lab Review:     Results from last 7 days   Lab Units 03/01/19  0638 02/28/19  1812   SODIUM mmol/L 140  142   POTASSIUM mmol/L 4.3 3.2*   CHLORIDE mmol/L 102 101   CO2 mmol/L 24.8 26.4   BUN mg/dL 17 17   CREATININE mg/dL 1.16* 1.38*   GLUCOSE mg/dL 149* 222*   CALCIUM mg/dL 9.4 9.5   AST (SGOT) U/L  --  11   ALT (SGPT) U/L  --  14     Results from last 7 days   Lab Units 03/01/19  0638 02/28/19  1812   TROPONIN T ng/mL 0.020 0.020     Results from last 7 days   Lab Units 03/01/19  0638   WBC 10*3/mm3 10.74   HEMOGLOBIN g/dL 9.2*   HEMATOCRIT % 31.5*   PLATELETS 10*3/mm3 295     Results from last 7 days   Lab Units 02/28/19  1216   INR  1.39*                 Results from last 7 days   Lab Units 03/01/19  0638   PROBNP pg/mL 1,222.0*         Results from last 7 days   Lab Units 02/28/19  1812   TSH mIU/mL 0.181*           Imaging:     Echo pending        EKG:             I personally viewed and interpreted the patient's EKG/Telemetry data.    Assessment:       AV heart block    Anemia    RA (rheumatoid arthritis) (CMS/HCC)    Paraplegia (CMS/HCC)    CKD (chronic kidney disease), stage III (CMS/HCC)    History of pulmonary embolism    Postmenopausal vaginal bleeding    Murmur, cardiac    Third degree AV block (CMS/HCC)        Plan:      High grade AV block:   - Her carvedilol is being held; her last dose was prior to admission.  - This is a difficult situation given her infection history, however, her rhythm is unlikely to stabilize by stopping her carvedilol.   - Dr. Huitron and I discussed implanting a Micra pacemaker. Risks and benefits were discussed and Ms. Bonner is agreeable to proceed. However, she has not been NPO. It was felt that the best plan would be to insert temporary pacemaker with screw-in lead and proceed with Micra pacemaker implant on Monday after being evaluated by ID.     Thank you for allowing me to participate in the care of Saba Bonner. Feel free to contact me directly with any further questions or concerns.    SERGIO Valdivia  [facility]  03/01/19  4:50 PM

## 2019-03-01 NOTE — CONSULTS
Date of Hospital Visit: 19  Encounter Provider: Miracle Iraheta MD  Place of Service: [facility] CARDIOLOGY  Patient Name: Saba Bonner  :1945  [id number]  Referral Provider: Aron Monique MD    Chief complaint: Vaginal Bleeding    Consulted for: Bradycardia / Heart Block    History of Present Illness:     Saba Bonner is a 74-year-old female with a history of rheumatoid arthrisis on chronic steriods, HTN, HLD, CAD, left bundle branch block, obesity, CVA, DVT with PE on Eliquis (previously on warfarin)    She follows with Dr. Hernández and last saw him in 2018.  She had a drug-eluting stent placed to her right coronary artery in .  He reports that since that time she has had numerous stress tests which have all been normal and echoes with normal LV function and no significant valvular heart disease.    She was hospitalized in May 2016 with sepsis from what sounds like an infected artificial knee.  She had intermittent AV block as well as torsades the point which required defibrillation.  She had a heart catheterization done at that time which did demonstrated mild nonobstructive disease of the left system.  The right coronary artery had a stent in the mid vessel with a focal 70-80% in-stent stenosis which appeared chronic and intervention was not performed given her other critical medical issues.  At that time, a temporary pacemaker wire was placed to stabilize her rhythm and this was removed once the sepsis was treated.  She had a nuclear stress test on 2016 presumably to see if the right coronary artery lesion was significant.  It was technically difficult but there was no evidence of ischemia.    She was previously seen by Dr. Traylor inpaitent consult in 10/2016.    She reports a lot of vague complaints for the last couple of weeks.  She has had some shortness of breath.  She notices that when she takes Norco she gets a tight feeling in her chest  but has attributed that to acid reflux.  She has had some dizzy spells.  She has not had syncope.  She is noticed some increased lower extremity edema and increase her Lasix which did help with the swelling.  She has been under a lot of stress with the recent move.    What actually brought her to the hospital was new onset of vaginal bleeding. She is postmenopausal for over 20 years and denies prior vaginal bleeding. She also denies any recent ibuprofen, Aleve, or aspirin use. She has had a history of anemia that has required transfusions in the past.     Her Hgb in the ED was 8.1. She had a vaginal ultrasound performed, which was unremarkable. She has received 1 unit of PRBC, which increased her Hgb to 9.2. Her Troponin level has been positive, but stable, at 0.020, Cr 1.16 (improved from 1.38 yesterday). Her proBNP this morning was 1,222. An echocardiogram has been ordered and results pending.       Previous Cardiac Testing:  Cardiac Catheterization 10/4/2016  FINDINGS:  1. HEMODYNAMICS:  /14, /57/75.  2. LEFT VENTRICULOGRAPHY: EF 70%.  There is a mid cavity near obliteration.  There is severe mitral annular calcification.  3. CORONARY ANGIOGRAPHY: Right dominant system, one-vessel disease.  The left main has 30% distal stenosis.  The LAD is calcified.  The proximal vessel has 20% diffuse disease.  There is a 40% focal stenosis just after the origin of the diagonal branch.  The diagonal branch has 20% diffuse stenosis.  There is a stent in the diagonal branch with 0% stenosis.  The ramus branch has 20% diffuse disease.  The circumflex is tortuous with 30% proximal stenosis.  The RCA has 80% focal mid restenotic lesion.  The remainder of the RCA has 10% diffuse disease.    4. INTERVENTIONAL COMMENTS:  Successful PTCA of the mid-RCA was performed as described above.  SUMMARY: Hyperdynamic LV systolic function with focal restenosis of the RCA treated with balloon angioplasty.  RECOMMENDATIONS: Continue  prior anticoagulation and antiplatelet regimen.    Echo 10/01/2016  · Calculated EF = 50.3%.  · Left ventricular wall thickness is consistent with mild concentric hypertrophy.  · Left ventricular function is low normal.  · All left ventricular wall segments contract normally.  · Left ventricular diastolic dysfunction (grade I) consistent with impaired relaxation.  · Mild tricuspid valve regurgitation is present.  · Mild mitral valve regurgitation is present      Past Medical History:   Diagnosis Date   • Coronary artery disease    • Deep vein thrombosis (CMS/HCC)    • Depression    • Hiatal hernia    • Hyperlipidemia    • Hypertension    • Hypothyroid    • Low back pain    • Myocardial infarction (CMS/HCC)     PT DENIES, ONLY SYMPTOM SHORTNESS OF BREATH   • Osteoarthritis    • Osteomyelitis of knee region (CMS/HCC)     left   • Pulmonary disease    • Pulmonary embolism (CMS/HCC) 2016    bilateral   • RA (rheumatoid arthritis) (CMS/HCC)    • Stroke (CMS/HCC)        Past Surgical History:   Procedure Laterality Date   • APPENDECTOMY     • CARDIAC CATHETERIZATION N/A 10/4/2016    Procedure: Left Heart Cath;  Surgeon: Kyaw Mcneil MD;  Location: [facility] CATH INVASIVE LOCATION;  Service:    •  SECTION     • KNEE SURGERY     • KYPHOPLASTY  2014    L3 kyphoplasty- Dr. Brush   • LUMBAR FUSION  2015   • LUMBAR LAMINECTOMY DISCECTOMY DECOMPRESSION  2015       Medications Prior to Admission   Medication Sig Dispense Refill Last Dose   • ALPRAZolam (XANAX) 0.5 MG tablet Take 1 tablet by mouth 2 (Two) Times a Day As Needed for Anxiety. 60 tablet 2    • amLODIPine (NORVASC) 10 MG tablet Take 1 tablet by mouth Daily. 30 tablet 2    • apixaban (ELIQUIS) 5 MG tablet tablet Take 5 mg by mouth 2 (Two) Times a Day.      • aspirin 81 MG chewable tablet Chew 81 mg Daily.   Taking   • atorvastatin (LIPITOR) 20 MG tablet Take 20 mg by mouth Daily.      • carvedilol (COREG) 12.5 MG tablet Take 12.5 mg by mouth 2 (Two)  Times a Day With Meals.      • DULoxetine (CYMBALTA) 60 MG capsule Take 60 mg by mouth 2 (Two) Times a Day.      • furosemide (LASIX) 40 MG tablet Take 40 mg by mouth Daily.      • isosorbide mononitrate (IMDUR) 30 MG 24 hr tablet Take 1 tablet by mouth Daily. 30 tablet 3    • levothyroxine (SYNTHROID, LEVOTHROID) 125 MCG tablet Take 125 mcg by mouth Daily.   Taking   • omeprazole (PriLOSEC) 20 MG capsule Take 20 mg by mouth daily.   Taking   • oxyCODONE-acetaminophen (PERCOCET)  MG per tablet Take 1 tablet by mouth Every 4 (Four) Hours As Needed for Moderate Pain . 180 tablet 0    • potassium chloride (KLOR-CON) 20 MEQ CR tablet Take 20 mEq by mouth 2 (Two) Times a Day.      • predniSONE (DELTASONE) 10 MG tablet Take 10 mg by mouth Daily.      • QUEtiapine (SEROquel) 25 MG tablet Take 12.5 mg by mouth 2 (Two) Times a Day.      • traZODone (DESYREL) 50 MG tablet Take 50 mg by mouth At Night As Needed for Sleep.          Current Meds  Scheduled Meds:    acetaminophen 650 mg Oral 4x Daily   aspirin 81 mg Oral Daily   atorvastatin 20 mg Oral Daily   DULoxetine 60 mg Oral BID   furosemide 40 mg Oral Daily   isosorbide mononitrate 30 mg Oral Daily   levothyroxine 100 mcg Oral Daily   pantoprazole 40 mg Oral QAM   potassium chloride 20 mEq Oral BID With Meals   predniSONE 10 mg Oral Daily   QUEtiapine 12.5 mg Oral BID   sodium chloride 3 mL Intravenous Q12H     Continuous Infusions:   PRN Meds:.acetaminophen  •  ALPRAZolam  •  ondansetron **OR** ondansetron ODT **OR** ondansetron  •  oxyCODONE-acetaminophen  •  [COMPLETED] Insert peripheral IV **AND** sodium chloride  •  [COMPLETED] Insert peripheral IV **AND** sodium chloride  •  sodium chloride  •  traZODone    Allergies as of 02/28/2019 - Reviewed 02/28/2019   Allergen Reaction Noted   • Clindamycin/lincomycin  09/28/2016       Social History     Socioeconomic History   • Marital status:      Spouse name: Not on file   • Number of children: 2   • Years of  "education: MD   • Highest education level: Not on file   Social Needs   • Financial resource strain: Not on file   • Food insecurity - worry: Not on file   • Food insecurity - inability: Not on file   • Transportation needs - medical: Not on file   • Transportation needs - non-medical: Not on file   Occupational History   • Occupation: RETIRED   Tobacco Use   • Smoking status: Never Smoker   • Smokeless tobacco: Never Used   Substance and Sexual Activity   • Alcohol use: Yes     Alcohol/week: 3.0 oz     Types: 5 Glasses of wine per week     Comment: Social use   • Drug use: No   • Sexual activity: Defer   Other Topics Concern   • Not on file   Social History Narrative    INDEPENDENT LIVING       Family History   Problem Relation Age of Onset   • Other Other         CABG   • Heart disease Mother    • Hypertension Mother    • Heart attack Mother    • Heart disease Father    • Hypertension Father    • Heart attack Father    • No Known Problems Brother    • Colon cancer Maternal Grandmother    • No Known Problems Maternal Grandfather    • No Known Problems Paternal Grandmother    • No Known Problems Paternal Grandfather        REVIEW OF SYSTEMS:   ROS was performed and is negative except as outlined in HPI     REVIEW OF SYSTEMS: negative expect what is in the HPI. She also complains of back pain.       Objective:   Temp:  [97.5 °F (36.4 °C)-98.4 °F (36.9 °C)] 98 °F (36.7 °C)  Heart Rate:  [39-72] 39  Resp:  [18-20] 18  BP: (108-152)/(51-94) 137/60  Body mass index is 32.78 kg/m².  Flowsheet Rows      First Filed Value   Admission Height  157.5 cm (62\") Documented at 02/28/2019 1201   Admission Weight  83.9 kg (185 lb) Documented at 02/28/2019 1201        Vitals:    03/01/19 0726   BP: 137/60   Pulse: (!) 39   Resp: 18   Temp: 98 °F (36.7 °C)   SpO2: 98%       Head:    Normocephalic, without obvious abnormality, atraumatic   Eyes:            Lids and lashes normal, conjunctivae and sclerae normal, no   icterus, no pallor "   Ears:    Ears appear intact with no abnormalities noted   Throat:   No oral lesions, dentition good   Neck:   No adenopathy, supple, trachea midline, no thyromegaly, no   carotid bruit, no JVD   Lungs:     Breath sounds are equal and clear to auscultation    Heart:   Bradycardic but regular   Abdomen:     Normal bowel sounds, no masses, no organomegaly, soft        non-tender, non-distended, no guarding   Extremities:   Moves all extremities well, no edema, no cyanosis, no redness   Pulses:   Pulses palpable and equal bilaterally.    Skin:  Psychiatric:   No bleeding, bruising or rash    Awake, alert and oriented x 3, normal mood and affect             EKG          Baseline       I personally viewed and interpreted the patient's EKG/Telemetry data    Assessment:  Active Hospital Problems    Diagnosis Date Noted   • Postmenopausal vaginal bleeding [N95.0] 02/28/2019   • Murmur, cardiac [R01.1] 02/28/2019   • CKD (chronic kidney disease), stage III (CMS/[facility]) [N18.3] 12/18/2018   • RA (rheumatoid arthritis) (CMS/[facility]) [M06.9] 12/18/2018   • Paraplegia (CMS/[facility]) [G82.20] 12/18/2018   • History of pulmonary embolism [Z86.711] 12/18/2018   • Anemia [D64.9] 09/30/2016      Resolved Hospital Problems   No resolved problems to display.       1.  Third-degree AV block.  Blood pressure is tolerating it.  Thyroid is normal.  Electrolytes are normal.  Echocardiogram is pending.  I will ask Dr. Huitron to see patient.  2.  History of coronary artery disease status post myocardial infarction and stent.  Her last heart catheterization was in 2016.  The left system was normal.  The right system had some in-stent stenosis.  She is not having any symptoms which sound ischemic.  It looks like she has a history of having some low level abnormal troponin.  I am not seeing a rise and fall to suggest myocardial infarction at this time.  3.  Systemic hypertension  4.  Hyperlipidemia  5.  Rheumatoid arthritis on chronic steroids  6.   History of stroke  7.  History of deep vein thrombosis with bilateral pulmonary embolus.  On Eliquis.  8.  Vaginal bleeding    Miracle Iraheta MD  03/01/19  11:41 AM.

## 2019-03-01 NOTE — PROGRESS NOTES
Name: Saba Bonner ADMIT: 2019   : 1945  PCP: Supa Otero MD    MRN: [id number] LOS: 1 days   AGE/SEX: 74 y.o. female  ROOM: [facility]   Subjective   CC: vaginal bleeding  Patient found to be in complete heart block last night.  She has been asymptomatic with nml BP.  No more bleeding. Taking PO.  No CP/dyspnea/f/c/n/v/d.    Objective   Vital Signs  Temp:  [97.5 °F (36.4 °C)-98.4 °F (36.9 °C)] 98 °F (36.7 °C)  Heart Rate:  [39-72] 39  Resp:  [18-20] 18  BP: (108-152)/(51-94) 137/60  SpO2:  [91 %-98 %] 98 %  on  Flow (L/min):  [1] 1;   Device (Oxygen Therapy): nasal cannula  Body mass index is 32.78 kg/m².    Physical Exam   Constitutional: No distress.   HENT:   Head: Normocephalic and atraumatic.   Mouth/Throat: Oropharynx is clear and moist.   Eyes: Conjunctivae and EOM are normal. Pupils are equal, round, and reactive to light.   Neck: Normal range of motion. Neck supple. No JVD present.   Cardiovascular: Regular rhythm and intact distal pulses. Bradycardia present.   Pulmonary/Chest: Effort normal and breath sounds normal.   Abdominal: Soft. Bowel sounds are normal.   Musculoskeletal: She exhibits edema. She exhibits no tenderness.   Neurological: She is alert.   Skin: Skin is warm and dry. She is not diaphoretic.   Psychiatric: She has a normal mood and affect. Her behavior is normal.   Nursing note and vitals reviewed.      Results Review:       I reviewed the patient's new clinical results.  Results from last 7 days   Lab Units 19  0638 19  1216   WBC 10*3/mm3 10.74 11.06*   HEMOGLOBIN g/dL 9.2* 8.1*   PLATELETS 10*3/mm3 295 315     Results from last 7 days   Lab Units 19  0638 19  1812   SODIUM mmol/L 140 142   POTASSIUM mmol/L 4.3 3.2*   CHLORIDE mmol/L 102 101   CO2 mmol/L 24.8 26.4   BUN mg/dL 17 17   CREATININE mg/dL 1.16* 1.38*   GLUCOSE mg/dL 149* 222*   Estimated Creatinine Clearance: 43.3 mL/min (A) (by C-G formula based on SCr of 1.16 mg/dL (H)).  Results  from last 7 days   Lab Units 02/28/19  1812   ALBUMIN g/dL 3.70   BILIRUBIN mg/dL 0.2   ALK PHOS U/L 95   AST (SGOT) U/L 11   ALT (SGPT) U/L 14     Results from last 7 days   Lab Units 03/01/19  0638 02/28/19  1812   CALCIUM mg/dL 9.4 9.5   ALBUMIN g/dL  --  3.70           acetaminophen 650 mg Oral 4x Daily   aspirin 81 mg Oral Daily   atorvastatin 20 mg Oral Daily   DULoxetine 60 mg Oral BID   furosemide 40 mg Oral Daily   isosorbide mononitrate 30 mg Oral Daily   levothyroxine 100 mcg Oral Daily   pantoprazole 40 mg Oral QAM   potassium chloride 20 mEq Oral BID With Meals   predniSONE 10 mg Oral Daily   QUEtiapine 12.5 mg Oral BID   sodium chloride 3 mL Intravenous Q12H      Diet Regular; Cardiac      Assessment/Plan      Active Hospital Problems    Diagnosis Date Noted   • Third degree AV block (CMS/HCC) [I44.2] 03/01/2019   • Postmenopausal vaginal bleeding [N95.0] 02/28/2019   • Murmur, cardiac [R01.1] 02/28/2019   • CKD (chronic kidney disease), stage III (CMS/HCC) [N18.3] 12/18/2018   • RA (rheumatoid arthritis) (CMS/HCC) [M06.9] 12/18/2018   • Paraplegia (CMS/HCC) [G82.20] 12/18/2018   • History of pulmonary embolism [Z86.711] 12/18/2018   • Anemia [D64.9] 09/30/2016      Resolved Hospital Problems   No resolved problems to display.   Complete Heart Block  - BPs okay and she is asymptomatic-no ischemic symptoms either  - troponins negative, echocardiogram pending  - BB held  - cardiology following, appreciate recs    Vaginal Bleeding  - this appears to have stopped  - holding eliquis (hx of DVT) for now  - ultrasound is nml  - had PAP last night, awaiting results  - gynecology following, appreciate recs    Hypothyroidism  - TSH is low, reduced home dose of levothyroxine  - follow TSH in 4-6 weeks as outpatient    DVT Prophylaxis  - on AC with eliquis-will consider SCDs or prophylactic lovenox if this needs to be held for a prolonged period    Thanks to consultatnts.    Aron Monique MD  [facility]  03/01/19  2:02 PM

## 2019-03-02 NOTE — PROGRESS NOTES
Name: Saba Bonner ADMIT: 2019   : 1945  PCP: Supa Otero MD    MRN: [id number] LOS: 2 days   AGE/SEX: 74 y.o. female  ROOM: /   Subjective   CC: vaginal bleeding  Patient had temporary pacemaker placed yesterday for 3rd degree AV block and tolerated well.  No more bleeding. Taking PO.  No CP/dyspnea/f/c/n/v/d.    Objective   Vital Signs  Temp:  [97.3 °F (36.3 °C)-98.5 °F (36.9 °C)] 98 °F (36.7 °C)  Heart Rate:  [44-80] 80  Resp:  [18] 18  BP: (127-149)/(64-89) 130/73  SpO2:  [92 %-97 %] 97 %  on   ;   Device (Oxygen Therapy): room air  Body mass index is 32.78 kg/m².    Physical Exam   Constitutional: No distress.   HENT:   Head: Normocephalic and atraumatic.   Mouth/Throat: Oropharynx is clear and moist.   Eyes: Conjunctivae and EOM are normal. Pupils are equal, round, and reactive to light.   Neck: Normal range of motion. Neck supple. No JVD present.   Cardiovascular: Normal rate, regular rhythm and intact distal pulses.   Pulmonary/Chest: Effort normal and breath sounds normal.   Abdominal: Soft. Bowel sounds are normal.   Musculoskeletal: She exhibits edema. She exhibits no tenderness.   Neurological: She is alert.   Skin: Skin is warm and dry. She is not diaphoretic.   Psychiatric: She has a normal mood and affect. Her behavior is normal.   Nursing note and vitals reviewed.      Results Review:       I reviewed the patient's new clinical results.  Results from last 7 days   Lab Units 19  0756 19  0104 19  1754 19  0638 19  1216   WBC 10*3/mm3  --  12.92*  --  10.74 11.06*   HEMOGLOBIN g/dL 10.0* 9.6* 10.2* 9.2* 8.1*   PLATELETS 10*3/mm3  --  305  --  295 315     Results from last 7 days   Lab Units 19  0104 19  0638 19  1812   SODIUM mmol/L 140 140 142   POTASSIUM mmol/L 3.7 4.3 3.2*   CHLORIDE mmol/L 101 102 101   CO2 mmol/L 25.7 24.8 26.4   BUN mg/dL 21 17 17   CREATININE mg/dL 1.07* 1.16* 1.38*   GLUCOSE mg/dL 129* 149* 222*    Estimated Creatinine Clearance: 47 mL/min (A) (by C-G formula based on SCr of 1.07 mg/dL (H)).  Results from last 7 days   Lab Units 02/28/19  1812   ALBUMIN g/dL 3.70   BILIRUBIN mg/dL 0.2   ALK PHOS U/L 95   AST (SGOT) U/L 11   ALT (SGPT) U/L 14     Results from last 7 days   Lab Units 03/02/19  0104 03/01/19  0638 02/28/19  1812   CALCIUM mg/dL 9.5 9.4 9.5   ALBUMIN g/dL  --   --  3.70           acetaminophen 650 mg Oral 4x Daily   aspirin 81 mg Oral Daily   atorvastatin 20 mg Oral Daily   DULoxetine 60 mg Oral BID   furosemide 40 mg Oral Daily   isosorbide mononitrate 30 mg Oral Daily   levothyroxine 100 mcg Oral Daily   pantoprazole 40 mg Oral QAM   potassium chloride 20 mEq Oral BID With Meals   predniSONE 10 mg Oral Daily   QUEtiapine 12.5 mg Oral BID   sodium chloride 3 mL Intravenous Q12H      Diet Regular      Assessment/Plan      Active Hospital Problems    Diagnosis Date Noted   • **AV heart block [I44.30] 02/28/2019   • Third degree AV block (CMS/HCC) [I44.2] 03/01/2019   • Postmenopausal vaginal bleeding [N95.0] 02/28/2019   • Murmur, cardiac [R01.1] 02/28/2019   • CKD (chronic kidney disease), stage III (CMS/HCC) [N18.3] 12/18/2018   • RA (rheumatoid arthritis) (CMS/HCC) [M06.9] 12/18/2018   • Paraplegia (CMS/[facility]) [G82.20] 12/18/2018   • History of pulmonary embolism [Z86.711] 12/18/2018   • Anemia [D64.9] 09/30/2016      Resolved Hospital Problems   No resolved problems to display.   Complete Heart Block  - BPs okay and she is asymptomatic-no ischemic symptoms either  - troponins negative, echocardiogram results noted  - BB held  - temporary pacemaker placed 3/1/19, plans for Micra on Monday pending ID eval noted  - cardiology following, appreciate recs    Vaginal Bleeding  - this appears to have stopped  - holding eliquis (hx of DVT) for now  - ultrasound is nml  - awaiting pap results  - gynecology following, appreciate recs    Hypothyroidism  - TSH is low, reduced home dose of levothyroxine  -  follow TSH in 4-6 weeks as outpatient    Hx of L TKA with chronic OM  - had seen ID at [place] and had been on suppressive doxycycline-she reports now that she still takes this, though it is not on her medication list-will restart  - ID consulted prior to pacemaker implantation    DVT Prophylaxis  - on AC with eliquis-will consider SCDs or prophylactic lovenox if this needs to be held for a prolonged period    Thanks to consultatnts.    Aron Monique MD  [facility]  03/02/19  10:43 AM

## 2019-03-02 NOTE — PLAN OF CARE
Problem: Fall Risk (Adult)  Intervention: Monitor/Assist with Self Care   03/02/19 0101   Activity   Activity Assistance Provided assistance, 1 person   Daily Care Interventions   Self-Care Promotion independence encouraged;BADL personal objects within reach     Intervention: Reduce Risk/Promote Restraint Free Environment   03/02/19 0101   Safety Management   Environmental Safety Modification assistive device/personal items within reach;clutter free environment maintained;room near unit station   Safety Promotion/Fall Prevention activity supervised;fall prevention program maintained       Goal: Absence of Fall  Outcome: Ongoing (interventions implemented as appropriate)   03/02/19 0101   Fall Risk (Adult)   Absence of Fall making progress toward outcome

## 2019-03-02 NOTE — CONSULTS
Referring Provider: Aron Monique MD  [id number]  [address] [address]  Reason for Consultation: History of left prosthetic knee septic arthritis    Subjective   History of present illness: This is a 74-year-old female with a history of rheumatoid arthritis on steroids, hypertension, hypothyroidism, stroke, and coronary artery disease who was admitted to the hospital on February 28 with vaginal bleeding.  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  Hardware was removed at that time and cultures grew Serratia and staph epidermidis.  She was treated with a 6-week course of vancomycin and cefepime.  She is seen and followed by orthopedic surgery and infectious disease at [place].  She presented to [facility] in November 2016 with recurrent left knee pain and erythema.  She was transferred to [place].  They were apparently knee aspiration cultures were negative.  The patient underwent knee fusion on November 14, 2016 and was treated with a 6-week course of vancomycin and cefepime.  She completed her IV antibiotics December 2016 and has been on suppressive doxycycline since that time.  She was decreased to 50 mg p.o. twice daily in June 2017.  In January 2018 she fell and injured her left knee.  She had to undergo left revision arthrodesis.  Gram stain showed gram-positive cocci but cultures were negative.  She was treated with a 2-week course of vancomycin and then maintained on her suppressive doxycycline.  She was readmitted in March 2018 with left knee drainage.  Cultures grew Klebsiella and the patient was treated with a 6-week course of ciprofloxacin in addition to doxycycline.  Plan was to keep her on suppressive ciprofloxacin until August or September 2018.  She has not had issues with her knee since March of last year.  Her incision has healed well.  There is no pain erythema or swelling of the knee.    Upon admission she was observed to be in  third-degree AV block and cardiology was consulted.  A temporary pacemaker was placed on .  The plan is for Micra placement on Monday      Past Medical History:   Diagnosis Date   • Deep vein thrombosis (CMS/HCC)    • Depression    • Hiatal hernia    • Hyperlipidemia    • Hypertension    • Hypothyroid    • Low back pain    • CAD    • Pulmonary embolism (CMS/HCC) 2016    bilateral   • RA (rheumatoid arthritis) (CMS/HCC)    • Stroke (CMS/HCC)        Past Surgical History:   Procedure Laterality Date   • APPENDECTOMY     •  SECTION     • KNEE SURGERY     • KYPHOPLASTY  2014    L3 kyphoplasty- Dr. Brush   • LUMBAR FUSION     • LUMBAR LAMINECTOMY DISCECTOMY DECOMPRESSION          reports that  has never smoked. she has never used smokeless tobacco. She reports that she drinks about 3.0 oz of alcohol per week. She reports that she does not use drugs.    family history includes Colon cancer in her maternal grandmother; Heart attack in her father and mother; Heart disease in her father and mother; Hypertension in her father and mother; No Known Problems in her brother, maternal grandfather, paternal grandfather, and paternal grandmother; Other in her other.    Allergies   Allergen Reactions   • Clindamycin/Lincomycin        Medication:  Antibiotics:  Doxycycline 50 mg p.o. twice daily    Prednisone 10 mg p.o. Daily    Please refer to the medical record for a full medication list    Review of Systems  Pertinent items are noted in HPI, all other systems reviewed and negative    Objective   Vital Signs   Temp:  [97.3 °F (36.3 °C)-98.5 °F (36.9 °C)] 97.3 °F (36.3 °C)  Heart Rate:  [44-80] 80  Resp:  [18] 18  BP: (101-149)/(55-89) 101/55    Physical Exam:   General: In no acute distress  HEENT: Normocephalic, atraumatic, PERRL, EOMI, no scleral icterus. Oropharynx is clear and moist  Neck: Supple, trachea is midline  Cardiovascular: Normal rate, regular rhythm, valdez S1 and S2, no murmurs,  rubs, or gallops    Respiratory: Lungs are cleat to ascultation bilaterally, no wheezing   GI: Abdomen is soft, non-tender, non-distended, positive bowel sounds bilaterally, no masses  Musculoskeletal: Left knee healed well, no erythema purulence drainage  Skin: No rashes or lesions  Extremities: no E/C/C  Neurological: Alert and oriented, moving all 4 extremities  Psychiatric: Normal mood and affect     Results Review:   I reviewed the patient's new clinical results.  I reviewed the patient's new imaging results and agree with the interpretation.    Lab Results   Component Value Date    WBC 12.92 (H) 03/02/2019    HGB 10.0 (L) 03/02/2019    HCT 34.4 03/02/2019    MCV 90.0 03/02/2019     03/02/2019       Lab Results   Component Value Date    GLUCOSE 129 (H) 03/02/2019    BUN 21 03/02/2019    CREATININE 1.07 (H) 03/02/2019    EGFRIFNONA 50 (L) 03/02/2019    EGFRIFAFRI 50 (L) 01/14/2019    BCR 19.6 03/02/2019    CO2 25.7 03/02/2019    CALCIUM 9.5 03/02/2019    PROTENTOTREF 6.6 01/14/2019    ALBUMIN 3.70 02/28/2019    LABIL2 1.4 01/14/2019    AST 11 02/28/2019    ALT 14 02/28/2019       Microbiology:  none    Radiology:  Ultrasound of the pelvis shows normal appearance of the uterus.    Assessment/Plan   1.  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  Multiple surgeries with multiple recurrent infection.  Last surgery in March2018  -She is followed by [facility] GAIN[facility]  - given her history of recurrent infection she is certainly at risk.  She currently does not have any signs or symptoms of ongoing infection in her left knee is healed.  She is cleared for pacemaker placement from an ID standpoint.  -Continue the patient on suppressive doxycycline 50mg p.o. twice daily as prescribed per [facility]IN[facility]    2.  Heart block status post temporary pacemaker placement    3.  Vaginal bleeding  -Management per primary team and OB/GYN    I discussed the patients findings and my recommendations with patient    Thank  you for this consult.  ID will sign off.  Please do not hesitate to call us with further questions or concerns

## 2019-03-02 NOTE — PLAN OF CARE
Problem: Patient Care Overview  Goal: Plan of Care Review  Outcome: Ongoing (interventions implemented as appropriate)   03/01/19 2010   Coping/Psychosocial   Plan of Care Reviewed With patient   Plan of Care Review   Progress no change   OTHER   Outcome Summary Pt VSS. 100% paced. Complaints of chronic back pain, oral pain meds given. Purewick placed, no vaginal bleeding noted. Will continue to monitor.

## 2019-03-02 NOTE — PROGRESS NOTES
GYN Progress Note    Patient doing ok, denies any pain. No bleeding for 2 days. She was transfused to Hgb 9.2. Reports scheduled for permanent pacemaker placement on Monday     Vitals:    03/01/19 0726   BP: 137/60   Pulse:    Resp: 18   Temp: 98 °F (36.7 °C)   SpO2: 98%     Physical Exam:  Gen: NAD, alert, oriented    : deferred, no blood visible on perineum, no blood on pad     A/P: 75 yo female w/ PMB  1) PMB   -no known etiology at this time, US normal, pap and pelvic exam performed yesterday,  awaiting pap results   - no bleeding currently   - anticoagulants discontinued at this time   - discussed possible causes of bleeding including cervical polyp, cervical dysplasia,  endometrial atrophy, endometrial malignancy   - discussed waiting for Pap results most likely back on Monday or Tuesday. Will follow up at that time. Also discussed F/U in the office for more adequate pelvic exam depending on the pap results      Lindsey Dumont MD

## 2019-03-02 NOTE — PROGRESS NOTES
"Patient Name: Saba Bonner  :1945  74 y.o.      Patient Care Team:  Supa Otero MD as PCP - General (Internal Medicine)  Dane Farrell PA as PCP - Claims Attributed  Tico Brush MD as Surgeon (Neurosurgery)  Jonah Hernadez MD as Consulting Physician (Rheumatology)  Asael Crawford MD (Infectious Diseases)    Interval History:   Temporary pacemaker placed.    Subjective:  Following for third-degree heart block    Objective   Vital Signs  Temp:  [97.3 °F (36.3 °C)-98.5 °F (36.9 °C)] 98 °F (36.7 °C)  Heart Rate:  [44-80] 80  Resp:  [18] 18  BP: (127-149)/(64-89) 130/73    Intake/Output Summary (Last 24 hours) at 3/2/2019 0817  Last data filed at 3/2/2019 0435  Gross per 24 hour   Intake 210 ml   Output 2300 ml   Net -2090 ml     Flowsheet Rows      First Filed Value   Admission Height  157.5 cm (62\") Documented at 2019 1201   Admission Weight  83.9 kg (185 lb) Documented at 2019 1201          Physical Exam:   She is awake and alert and in no distress.  External pacemaker she has no signs of infection or bleeding.  Rhythm is a sensed V paced on the monitor.                          Results Review:    Results from last 7 days   Lab Units 19  0104   SODIUM mmol/L 140   POTASSIUM mmol/L 3.7   CHLORIDE mmol/L 101   CO2 mmol/L 25.7   BUN mg/dL 21   CREATININE mg/dL 1.07*   GLUCOSE mg/dL 129*   CALCIUM mg/dL 9.5     Results from last 7 days   Lab Units 19  0638 19  1812   TROPONIN T ng/mL 0.020 0.020     Results from last 7 days   Lab Units 19  0104   WBC 10*3/mm3 12.92*   HEMOGLOBIN g/dL 9.6*   HEMATOCRIT % 32.5*   PLATELETS 10*3/mm3 305     Results from last 7 days   Lab Units 19  1216   INR  1.39*                     Medication Review:     acetaminophen 650 mg Oral 4x Daily   aspirin 81 mg Oral Daily   atorvastatin 20 mg Oral Daily   DULoxetine 60 mg Oral BID   furosemide 40 mg Oral Daily   isosorbide mononitrate 30 mg Oral Daily   levothyroxine " 100 mcg Oral Daily   pantoprazole 40 mg Oral QAM   potassium chloride 20 mEq Oral BID With Meals   predniSONE 10 mg Oral Daily   QUEtiapine 12.5 mg Oral BID   sodium chloride 3 mL Intravenous Q12H             Assessment/Plan     1.  Third-degree AV block.  Blood pressure is tolerating it.  Thyroid is normal.  Electrolytes are normal. Echo with most likely normal LV function however the intermittent heart block made calculating an ejection fraction difficult.  She has a temporary pacemaker in.  Plan is for a Micra on Monday if infectious issues are sorted out by infectious disease.  2.  History of coronary artery disease status post myocardial infarction and stent.  Her last heart catheterization was in 2016.  The left system was normal.  The right system had some in-stent stenosis.  She is not having any symptoms which sound ischemic.  It looks like she has a history of having some low level abnormal troponin.  I am not seeing a rise and fall to suggest myocardial infarction at this time.  3.  Systemic hypertension  4.  Hyperlipidemia  5.  Rheumatoid arthritis on chronic steroids  6.  History of stroke  7.  History of deep vein thrombosis with bilateral pulmonary embolus.  Eliquis is on hold.  8.  Vaginal bleeding      Miracle Iraheta MD, [facility]  03/02/19  8:17 AM

## 2019-03-02 NOTE — PLAN OF CARE
Problem: Patient Care Overview  Goal: Plan of Care Review  Outcome: Ongoing (interventions implemented as appropriate)   03/02/19 0102   Coping/Psychosocial   Plan of Care Reviewed With patient   Plan of Care Review   Progress improving   OTHER   Outcome Summary VSS. Pt remains 100% paced on monitor, RIJ pacemaker site c/d/i with dressing intact. Pt c/o chronic bk pain; percocet home regimen continued. Will continue to monitor. Plan for micrapacer placement on Monday. ID consulted, still to see.

## 2019-03-03 NOTE — PROGRESS NOTES
Name: Saba Bonner ADMIT: 2019   : 1945  PCP: Supa Otero MD    MRN: [id number] LOS: 3 days   AGE/SEX: 74 y.o. female  ROOM: /   Subjective   CC: vaginal bleeding  Patient had temporary pacemaker placed for 3rd degree AV block and tolerated well.  No more bleeding. Taking PO.  No CP/dyspnea/f/c/n/v/d.  Overall feeling better    Objective   Vital Signs  Temp:  [98 °F (36.7 °C)-98.5 °F (36.9 °C)] 98 °F (36.7 °C)  Heart Rate:  [78-80] 80  Resp:  [16] 16  BP: (113-162)/(71-98) 162/90  SpO2:  [91 %-94 %] 91 %  on   ;   Device (Oxygen Therapy): room air  Body mass index is 32.78 kg/m².    Physical Exam   Constitutional: No distress.   HENT:   Head: Normocephalic and atraumatic.   Mouth/Throat: Oropharynx is clear and moist.   Eyes: Conjunctivae and EOM are normal. Pupils are equal, round, and reactive to light.   Neck: Normal range of motion. Neck supple. No JVD present.   Cardiovascular: Normal rate, regular rhythm and intact distal pulses.   Pulmonary/Chest: Effort normal and breath sounds normal.   Abdominal: Soft. Bowel sounds are normal.   Musculoskeletal: She exhibits edema. She exhibits no tenderness.   Neurological: She is alert.   Skin: Skin is warm and dry. She is not diaphoretic.   Psychiatric: She has a normal mood and affect. Her behavior is normal.   Nursing note and vitals reviewed.      Results Review:       I reviewed the patient's new clinical results.  Results from last 7 days   Lab Units 19  0357 19  0756 19  0104 19  1754 19  0638 19  1216   WBC 10*3/mm3 11.65*  --  12.92*  --  10.74 11.06*   HEMOGLOBIN g/dL 10.0* 10.0* 9.6* 10.2* 9.2* 8.1*   PLATELETS 10*3/mm3 302  --  305  --  295 315     Results from last 7 days   Lab Units 19  0357 19  0104 19  0638 19  1812   SODIUM mmol/L 140 140 140 142   POTASSIUM mmol/L 3.5 3.7 4.3 3.2*   CHLORIDE mmol/L 101 101 102 101   CO2 mmol/L 24.6 25.7 24.8 26.4   BUN mg/dL 23 21 17  17   CREATININE mg/dL 1.20* 1.07* 1.16* 1.38*   GLUCOSE mg/dL 99 129* 149* 222*   Estimated Creatinine Clearance: 41.9 mL/min (A) (by C-G formula based on SCr of 1.2 mg/dL (H)).  Results from last 7 days   Lab Units 02/28/19  1812   ALBUMIN g/dL 3.70   BILIRUBIN mg/dL 0.2   ALK PHOS U/L 95   AST (SGOT) U/L 11   ALT (SGPT) U/L 14     Results from last 7 days   Lab Units 03/03/19  0357 03/02/19  0104 03/01/19  0638 02/28/19  1812   CALCIUM mg/dL 9.4 9.5 9.4 9.5   ALBUMIN g/dL  --   --   --  3.70           acetaminophen 650 mg Oral 4x Daily   aspirin 81 mg Oral Daily   atorvastatin 20 mg Oral Daily   doxycycline 50 mg Oral Q12H   DULoxetine 60 mg Oral BID   enoxaparin 40 mg Subcutaneous Q24H   furosemide 40 mg Oral Daily   isosorbide mononitrate 30 mg Oral Daily   levothyroxine 100 mcg Oral Daily   nystatin  Topical Q12H   pantoprazole 40 mg Oral QAM   potassium chloride 20 mEq Oral BID With Meals   predniSONE 10 mg Oral Daily   QUEtiapine 12.5 mg Oral BID   sodium chloride 3 mL Intravenous Q12H   [START ON 3/4/2019] vancomycin 1,000 mg Intravenous On Call      Diet Regular  NPO Diet      Assessment/Plan      Active Hospital Problems    Diagnosis Date Noted   • **AV heart block [I44.30] 02/28/2019   • Third degree AV block (CMS/HCC) [I44.2] 03/01/2019   • Postmenopausal vaginal bleeding [N95.0] 02/28/2019   • Murmur, cardiac [R01.1] 02/28/2019   • CKD (chronic kidney disease), stage III (CMS/HCC) [N18.3] 12/18/2018   • RA (rheumatoid arthritis) (CMS/HCC) [M06.9] 12/18/2018   • Paraplegia (CMS/HCC) [G82.20] 12/18/2018   • History of pulmonary embolism [Z86.711] 12/18/2018   • Anemia [D64.9] 09/30/2016      Resolved Hospital Problems   No resolved problems to display.   Complete Heart Block  - BPs okay and she is asymptomatic-no ischemic symptoms either  - troponins negative, echocardiogram results noted  - BB held  - temporary pacemaker placed 3/1/19, plans for Micra on Monday okay to proceed per ID recommendations gerardo  noted  - cardiology following, appreciate recs    Vaginal Bleeding  - this appears to have stopped  - holding eliquis (hx of DVT) for now  - ultrasound is nml  - awaiting pap results to decide on further treatment or evaluation  - gynecology following, appreciate recs    Hypothyroidism  - TSH is low, reduced home dose of levothyroxine  - follow TSH in 4-6 weeks as outpatient    Hx of L TKA with chronic OM  - had seen ID at [facility] and had been on suppressive doxycycline-she reports now that she still takes this, though it is not on her medication list-will restart  - ID consulted prior to pacemaker implantation    DVT Prophylaxis  - on AC with eliquis-will consider SCDs or prophylactic lovenox if this needs to be held for a prolonged period    Thanks to consultatnts.    Neel Goldberg MD  [facility]  03/03/19  3:23 PM

## 2019-03-03 NOTE — PLAN OF CARE
Problem: Patient Care Overview  Goal: Plan of Care Review   03/03/19 1800   Coping/Psychosocial   Plan of Care Reviewed With patient   Plan of Care Review   Progress no change   OTHER   Outcome Summary pt vitals stable, NPO after mn for permanent pacemaker placement. painmeds given for chronic back pain, will cont to monitor

## 2019-03-03 NOTE — PROGRESS NOTES
"Patient Name: Saba Bonner  :1945  74 y.o.      Patient Care Team:  Supa Otero MD as PCP - General (Internal Medicine)  Dane Farrell PA as PCP - Claims Attributed  Tico Brush MD as Surgeon (Neurosurgery)  Jonah Hernadez MD as Consulting Physician (Rheumatology)  Asael Crawford MD (Infectious Diseases)    Interval History:   No CP    Subjective:  Following for 3rd degree HB     Objective   Vital Signs  Temp:  [97.3 °F (36.3 °C)-98.5 °F (36.9 °C)] 98 °F (36.7 °C)  Heart Rate:  [78-80] 80  Resp:  [16-18] 16  BP: (101-141)/(55-98) 141/98    Intake/Output Summary (Last 24 hours) at 3/3/2019 0922  Last data filed at 3/3/2019 0813  Gross per 24 hour   Intake 720 ml   Output 900 ml   Net -180 ml     Flowsheet Rows      First Filed Value   Admission Height  157.5 cm (62\") Documented at 2019 1201   Admission Weight  83.9 kg (185 lb) Documented at 2019 1201          Physical Exam:   General Appearance:    Alert, cooperative, in no acute distress   Lungs:     Clear to auscultation.  Normal respiratory effort and rate.      Heart:    Regular rhythm and normal rate, normal S1 and S2, no murmurs, gallops or rubs.     Chest Wall:    No abnormalities observed   Abdomen:     Soft, nontender, positive bowel sounds.     Extremities:   no cyanosis, clubbing or edema.  No marked joint deformities.  Adequate musculoskeletal strength.       Results Review:    Results from last 7 days   Lab Units 19  0357   SODIUM mmol/L 140   POTASSIUM mmol/L 3.5   CHLORIDE mmol/L 101   CO2 mmol/L 24.6   BUN mg/dL 23   CREATININE mg/dL 1.20*   GLUCOSE mg/dL 99   CALCIUM mg/dL 9.4     Results from last 7 days   Lab Units 19  0638 19  1812   TROPONIN T ng/mL 0.020 0.020     Results from last 7 days   Lab Units 19  0357   WBC 10*3/mm3 11.65*   HEMOGLOBIN g/dL 10.0*   HEMATOCRIT % 34.2   PLATELETS 10*3/mm3 302     Results from last 7 days   Lab Units 19  1216   INR  1.39*       "               Medication Review:     acetaminophen 650 mg Oral 4x Daily   aspirin 81 mg Oral Daily   atorvastatin 20 mg Oral Daily   doxycycline 50 mg Oral Q12H   DULoxetine 60 mg Oral BID   enoxaparin 40 mg Subcutaneous Q24H   furosemide 40 mg Oral Daily   isosorbide mononitrate 30 mg Oral Daily   levothyroxine 100 mcg Oral Daily   nystatin  Topical Q12H   pantoprazole 40 mg Oral QAM   potassium chloride 20 mEq Oral BID With Meals   predniSONE 10 mg Oral Daily   QUEtiapine 12.5 mg Oral BID   sodium chloride 3 mL Intravenous Q12H             Assessment/Plan     1.  Third-degree AV block.  Currently she has a temporary pacemaker.  ID did not have any objection to a Micra.  Plan will be to place it tomorrow.  2.  History of coronary artery disease status post myocardial infarction and stent.  Her last heart catheterization was in 2016.  The left system was normal.  The right system had some in-stent stenosis.  She is not having any symptoms which sound ischemic.  It looks like she has a history of having some low level abnormal troponin.  I am not seeing a rise and fall to suggest myocardial infarction at this time.  3.  Systemic hypertension  4.  Hyperlipidemia  5.  Rheumatoid arthritis on chronic steroids  6.  History of stroke  7.  History of deep vein thrombosis with bilateral pulmonary embolus.  Eliquis is on hold.  8.  Vaginal bleeding       Miracle Iraheta MD, [facility]  03/03/19  9:22 AM

## 2019-03-04 NOTE — DISCHARGE PLACEMENT REQUEST
"Saba Bonner (74 y.o. Female)     Date of Birth Social Security Number Address Home Phone MRN    1945  [id number] [place] [address] [phone] [id number]    Sabianist Marital Status          Yarsanism        Admission Date Admission Type Admitting Provider Attending Provider Department, Room/Bed    2/28/19 Emergency Aron Monique MD Richards, Stephen J, MD [address] CVI, 2201/1    Discharge Date Discharge Disposition Discharge Destination                       Attending Provider:  Neel Goldberg MD    Allergies:  Clindamycin/lincomycin    Isolation:  None   Infection:  None   Code Status:  CPR    Ht:  157.5 cm (62\")   Wt:  81.3 kg (179 lb 3.2 oz)    Admission Cmt:  None   Principal Problem:  AV heart block [I44.30] More...                 Active Insurance as of 2/28/2019     Primary Coverage     Payor Plan Insurance Group Employer/Plan Group    MEDICARE MEDICARE A & B      Payor Plan Address Payor Plan Phone Number Payor Plan Fax Number Effective Dates    [address] [phone]  2/1/2012 - None Entered    [facility] [id number]       Subscriber Name Subscriber Birth Date Member ID       SABA BONNER 1945 [id number]           Secondary Coverage     Payor Plan Insurance Group Employer/Plan Group    AARP MED SUPP AARP HEALTH CARE OPTIONS      Payor Plan Address Payor Plan Phone Number Payor Plan Fax Number Effective Dates    [facility] [phone]  1/1/2016 - None Entered    [address]       [facility] [id number]       Subscriber Name Subscriber Birth Date Member ID       SABA BONNER 1945 [id number]                 Emergency Contacts      (Rel.) Home Phone Work Phone Mobile Phone    Maco Bonner (Son) [phone] -- [phone]            "

## 2019-03-04 NOTE — THERAPY DISCHARGE NOTE
Acute Care - Physical Therapy Initial Eval/Discharge  [facility]     Patient Name: Saba Bonner  : 1945  MRN: [id number]  Today's Date: 3/4/2019   Onset of Illness/Injury or Date of Surgery: 19            Admit Date: 2019    Visit Dx:    ICD-10-CM ICD-9-CM   1. Vaginal bleeding N93.9 623.8   2. Anemia, unspecified type D64.9 285.9   3. AV heart block I44.30 426.10   4. Third degree AV block (CMS/HCC) I44.2 426.0     Patient Active Problem List   Diagnosis   • Dyspnea on exertion   • Anemia   • NSTEMI (non-ST elevated myocardial infarction) (CMS/[facility])   • CAD (coronary artery disease)   • Hypokalemia   • Leukocytosis   • Atelectasis   • Hypertension   • Septic arthritis of knee, left (CMS/[facility])   • Sepsis (CMS/[facility])   • Confusion   • Metabolic encephalopathy   • Dehydration   • Toxic encephalopathy due to Ambien   • Anxiety   • Vascular dementia without behavioral disturbance   • Infection of total left knee replacement (CMS/[facility])   • Wheeze   • Acute kidney injury - present on admission   • Hyponatremia   • Acidosis   • Herniation of lumbar intervertebral disc with radiculopathy   • Venous stasis   • Lumbar radiculopathy   • Anticoagulated   • MEDARDO (acute kidney injury) (CMS/[facility])   • Anemia   • RA (rheumatoid arthritis) (CMS/[facility])   • Depression   • Hiatal hernia   • Paraplegia (CMS/[facility])   • Anticoagulated on Coumadin   • HTN (hypertension)   • CKD (chronic kidney disease), stage III (CMS/[facility])   • History of pulmonary embolism   • Postmenopausal vaginal bleeding   • Murmur, cardiac   • Hypothyroid   • Third degree AV block (CMS/HCC)   • AV heart block     Past Medical History:   Diagnosis Date   • Coronary artery disease    • Deep vein thrombosis (CMS/HCC)    • Depression    • Hiatal hernia    • Hyperlipidemia    • Hypertension    • Hypothyroid    • Low back pain    • Myocardial infarction (CMS/HCC)     PT DENIES, ONLY SYMPTOM SHORTNESS OF BREATH   • Osteoarthritis    • Osteomyelitis of knee  region (CMS/[facility])     left   • Pulmonary disease    • Pulmonary embolism (CMS/[facility]) 2016    bilateral   • RA (rheumatoid arthritis) (CMS/[facility])    • Stroke (CMS/[facility])      Past Surgical History:   Procedure Laterality Date   • APPENDECTOMY     • CARDIAC CATHETERIZATION N/A 10/4/2016    Procedure: Left Heart Cath;  Surgeon: Kyaw Mcneil MD;  Location:  [facility];  Service:    • CARDIAC ELECTROPHYSIOLOGY PROCEDURE N/A 3/1/2019    Procedure: Temporary Pacemaker- Pacing lead and external pacemaker generator;  Surgeon: Shailesh Huitron MD;  Location:  [facility];  Service: Cardiology   •  SECTION     • KNEE SURGERY     • KYPHOPLASTY  2014    L3 kyphoplasty- Dr. Brush   • LUMBAR FUSION     • LUMBAR LAMINECTOMY DISCECTOMY DECOMPRESSION            PT ASSESSMENT (last 12 hours)      Physical Therapy Evaluation     Row Name 19 1402          PT Evaluation Time/Intention    Subjective Information  no complaints  -     Document Type  discharge evaluation/summary  -     Mode of Treatment  physical therapy  -     Patient Effort  good  -     Symptoms Noted During/After Treatment  none  -     Row Name 19 1408          General Information    Onset of Illness/Injury or Date of Surgery  19  -     Patient Observations  alert;cooperative;agree to therapy  -     Patient/Family Observations  pt supine in bed, no acute distress noted at rest  -     Prior Level of Function  independent:;transfer;w/c or scooter;bed mobility  -     Equipment Currently Used at Home  wheelchair, motorized  -     Pertinent History of Current Functional Problem  pt admitted with vaginal bleeding and had temporary pacemaker placed on 3/1 for complete heart block  -     Existing Precautions/Restrictions  cardiac  -     Barriers to Rehab  medically complex;previous functional deficit  -     Row Name 19 1400          Relationship/Environment    Lives With  alone   -     Row Name 03/04/19 1405          Resource/Environmental Concerns    Current Living Arrangements  independent/assisted living facility  -     Row Name 03/04/19 1405          Cognitive Assessment/Interventions    Additional Documentation  Cognitive Assessment/Intervention (Group)  -     Row Name 03/04/19 1405          Cognitive Assessment/Intervention- PT/OT    Orientation Status (Cognition)  oriented x 4  -     Follows Commands (Cognition)  WFL  -     Personal Safety Interventions  fall prevention program maintained;nonskid shoes/slippers when out of bed  -     Row Name 03/04/19 1405          Bed Mobility Assessment/Treatment    Bed Mobility Assessment/Treatment  supine-sit;sit-supine  -     Supine-Sit [place] (Bed Mobility)  supervision  -     Sit-Supine [place] (Bed Mobility)  not tested sitting in chair  -     Row Name 03/04/19 1405          Transfer Assessment/Treatment    Transfer Assessment/Treatment  sit-stand transfer;stand-sit transfer;stand pivot/stand step transfer  -     Comment (Transfers)  pt held onto chair while standing and turning to sit, no assistance required  -     Sit-Stand [place] (Transfers)  supervision  -     Stand-Sit [place] (Transfers)  supervision  -[facility] Name 03/04/19 1405          Stand Pivot/Stand Step Transfer    Stand Pivot/Stand Step [place]  supervision  -[facility] Name 03/04/19 1405          General ROM    GENERAL ROM COMMENTS  L knee fused in extension and some decreased R ankle DF/PF at baseline  -[facility] Name 03/04/19 1405          MMT (Manual Muscle Testing)    General MMT Comments  strength is functional, grossly >/=3/5  -     Row Name 03/04/19 1405          Motor Assessment/Intervention    Additional Documentation  Balance (Group)  -     Row Name 03/04/19 1405          Balance    Balance  static standing balance;dynamic standing balance  -[facility] Name 03/04/19 1405          Static Standing Balance     Level of [place] (Supported Standing, Static Balance)  supervision  -     Row Name 03/04/19 1405          Dynamic Standing Balance    Level of [place], Reaches Outside Midline (Standing, Dynamic Balance)  supervision  -[facility] Name 03/04/19 1405          Pain Assessment    Additional Documentation  Pain Scale: Numbers Pre/Post-Treatment (Group)  -[facility] Name 03/04/19 1405          Pain Scale: Numbers Pre/Post-Treatment    Pain Scale: Numbers, Pretreatment  4/10  -     Pain Location  back chronic  -     Pain Intervention(s)  Repositioned  -[facility] Name 03/04/19 1405          Plan of Care Review    Plan of Care Reviewed With  patient  -[facility] Name 03/04/19 1405          Physical Therapy Clinical Impression    Criteria for Skilled Interventions Met (PT Clinical Impression)  no problems identified which require skilled intervention;current level of function same as previous level of function  -[facility] Name 03/04/19 1405          Positioning and Restraints    Pre-Treatment Position  in bed  -     Post Treatment Position  chair  -     In Chair  notified nsg;reclined;call light within reach;encouraged to call for assist  -       User Key  (r) = Recorded By, (t) = Taken By, (c) = Cosigned By    Initials Name Provider Type     Leesa Thomas, PT Physical Therapist              PT Recommendation and Plan  Anticipated Discharge Disposition (PT): assisted living facility (skilled nursing)(independent living)  Planned Therapy Interventions (PT Eval): other (see comments)(no acute care PT needs identified, pt at baseline function)  Therapy Frequency (PT Clinical Impression): evaluation only  Outcome Summary/Treatment Plan (PT)  Anticipated Discharge Disposition (PT): assisted living facility (skilled nursing)(independent living)  Plan of Care Reviewed With: patient  Outcome Summary: Pt demonstrates adequate strength and balance to perform functional mobility and transfers independently. Pt reports she does  not feel as though she needs PT at this time. Nsg notified that pt is able to perfrom mobility and transfers independently and no PT is needed. PT will sign off.    Outcome Measures     Row Name 03/04/19 1600             How much help from another person do you currently need...    Turning from your back to your side while in flat bed without using bedrails?  4  -CH      Moving from lying on back to sitting on the side of a flat bed without bedrails?  4  -CH      Moving to and from a bed to a chair (including a wheelchair)?  4  -CH      Standing up from a chair using your arms (e.g., wheelchair, bedside chair)?  4  -CH      Climbing 3-5 steps with a railing?  1  -CH      To walk in hospital room?  1  -CH      AM-PAC 6 Clicks Score  18  -CH         Functional Assessment    Outcome Measure Options  AM-PAC 6 Clicks Basic Mobility (PT)  -        User Key  (r) = Recorded By, (t) = Taken By, (c) = Cosigned By    Initials Name Provider Type    Leesa Smith, PT Physical Therapist           Time Calculation:   PT Charges     Row Name 03/04/19 1649             Time Calculation    Start Time  1357  -CH      Stop Time  1405  -      Time Calculation (min)  8 min  -CH      PT Received On  03/04/19  -        User Key  (r) = Recorded By, (t) = Taken By, (c) = Cosigned By    Initials Name Provider Type    Leesa Smith, PT Physical Therapist        Therapy Suggested Charges     Code   Minutes Charges    None           Therapy Charges for Today     Code Description Service Date Service Provider Modifiers Qty    84260757010 HC PT EVAL LOW COMPLEXITY 1 3/4/2019 Leesa Thomsa, PT GP 1          PT G-Codes  Outcome Measure Options: AM-PAC 6 Clicks Basic Mobility (PT)  AM-PAC 6 Clicks Score: 18    PT Discharge Summary  Anticipated Discharge Disposition (PT): assisted living facility (halfway)(independent living)    Leesa Thomas, LUCIANA  3/4/2019

## 2019-03-04 NOTE — PLAN OF CARE
Problem: Patient Care Overview  Goal: Plan of Care Review  Outcome: Ongoing (interventions implemented as appropriate)   03/04/19 6260   Coping/Psychosocial   Plan of Care Reviewed With patient   OTHER   Outcome Summary Pt demonstrates adequate strength and balance to perform functional mobility and transfers independently. Pt reports she does not feel as though she needs PT at this time. Nsg notified that pt is able to perfrom mobility and transfers independently and no PT is needed. PT will sign off.

## 2019-03-04 NOTE — PLAN OF CARE
Problem: Fall Risk (Adult)  Goal: Absence of Fall  Outcome: Ongoing (interventions implemented as appropriate)      Problem: Patient Care Overview  Goal: Plan of Care Review  Outcome: Ongoing (interventions implemented as appropriate)   03/04/19 0512   Plan of Care Review   Progress no change   OTHER   Outcome Summary Pt c/p generalized pain relieved with prn pain medication, rested well throughout night, NPO for PPM today, vss     Goal: Discharge Needs Assessment  Outcome: Ongoing (interventions implemented as appropriate)      Problem: Skin Injury Risk (Adult)  Goal: Skin Health and Integrity  Outcome: Ongoing (interventions implemented as appropriate)

## 2019-03-04 NOTE — PROGRESS NOTES
Continued Stay Note  [facility]     Patient Name: Saba Bonner  MRN: [id number]  Today's Date: 3/4/2019    Admit Date: 2/28/2019    Discharge Plan     Row Name 03/04/19 1508       Plan    Plan  Home to [facility] w/ [facility]     Patient/Family in Agreement with Plan  yes    Plan Comments  S/W Pt at bedside.  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  Pt uses a motorized w/c.  Pt worked with physical therapy today and was able to transfer from bed to recliner independently.  Pt reports she has private caregivers who assist her several days a week with bathing.  Pt is requesting [facility] follow her at home.  Referral given to Monika Cancino, liaison [phone].  Pt reports she will need CCP to set up her transportation back home at d/c.  Pt is agreeable to [employer] or [employer].  CCP following.  VICKY AGUILAR/CCP        Discharge Codes    No documentation.             Lucretia Gomes RN

## 2019-03-04 NOTE — PROGRESS NOTES
Electrophysiology Follow-Up Note      Patient Name: Saba Bonner  Age/Sex: 74 y.o. female  : 1945  MRN: [id number]      Day of Service: 19       Chief Complaint/Follow-up: High degree AV block s/p temp pacer      S: No complaints except wants to get pacer done      Temp:  [97.7 °F (36.5 °C)-98.3 °F (36.8 °C)] 97.7 °F (36.5 °C)  Heart Rate:  [80] 80  Resp:  [16] 16  BP: (112-162)/(73-90) 130/76     PHYSICAL EXAM:    General Appearance: No acute distress.   Eyes: Conjunctiva and lids: No erythema, swelling, or discharge. Sclera non-icteric.   HENT: Atraumatic, normocephalic. External eyes, ears, and nose normal.   Respiratory: No signs of respiratory distress. Respiration rhythm and depth normal.   Clear to auscultation. No rales, crackles, rhonchi, or wheezing auscultated.   Cardiovascular:  Heart Rate and Rhythm: Normal, Heart Sounds: Normal S1 and S2.  Murmurs: No murmurs noted. No rubs, thrills, or gallops.   Lower Extremities: No edema noted.  Gastrointestinal:  Abdomen soft, non-distended, non-tender.  Musculoskeletal: Moves all extremities.  Skin: Warm and dry.   Psychiatric: Patient alert and oriented to person, place, and time. Speech and behavior appropriate. Normal mood and affect.       ECG/TELE: Vpaced      Results from last 7 days   Lab Units 19  0104   SODIUM mmol/L 139 140 140   POTASSIUM mmol/L 3.4* 3.5 3.7   CHLORIDE mmol/L 99 101 101   CO2 mmol/L 23.9 24.6 25.7   BUN mg/dL 22 23 21   CREATININE mg/dL 1.18* 1.20* 1.07*   GLUCOSE mg/dL 122* 99 129*   CALCIUM mg/dL 9.3 9.4 9.5     Results from last 7 days   Lab Units 19  0357 19  0756 19  010   WBC 10*3/mm3 12.13* 11.65*  --  12.92*   HEMOGLOBIN g/dL 10.7* 10.0* 10.0* 9.6*   HEMATOCRIT % 36.3 34.2 34.4 32.5*   PLATELETS 10*3/mm3 267 302  --  305     Results from last 7 days   Lab Units 19  1216   INR  1.39*     Results from last 7 days   Lab Units  03/01/19  0638 02/28/19  1812   TROPONIN T ng/mL 0.020 0.020     Results from last 7 days   Lab Units 02/28/19  1812   TSH mIU/mL 0.181*           Current Medications:   Scheduled Meds:  acetaminophen 650 mg Oral 4x Daily   aspirin 81 mg Oral Daily   atorvastatin 20 mg Oral Daily   doxycycline 50 mg Oral Q12H   DULoxetine 60 mg Oral BID   enoxaparin 40 mg Subcutaneous Q24H   ferrous sulfate 325 mg Oral BID With Meals   furosemide 40 mg Oral Daily   isosorbide mononitrate 30 mg Oral Daily   levothyroxine 100 mcg Oral Daily   nystatin  Topical Q12H   pantoprazole 40 mg Oral QAM   potassium chloride 20 mEq Oral BID With Meals   predniSONE 10 mg Oral Daily   QUEtiapine 12.5 mg Oral BID   sodium chloride 3 mL Intravenous Q12H   vancomycin 1,000 mg Intravenous On Call           AV heart block    Anemia    RA (rheumatoid arthritis) (CMS/HCC)    Paraplegia (CMS/HCC)    CKD (chronic kidney disease), stage III (CMS/HCC)    History of pulmonary embolism    Postmenopausal vaginal bleeding    Murmur, cardiac    Third degree AV block (CMS/HCC)       Plan:     Dr. Huitron and I saw Ms. Bonner, had rep check temp pacer earlier today and no underlying rhythm VVI at 30ppm except a few PVC's coming through, rhythm has not improved off beta blocker, will proceed with permanent pacemaker tomorrow. Plan to do a Micra tomorrow afternoon.     Alejandra Oliver, APRN  03/04/19  0610

## 2019-03-04 NOTE — PROGRESS NOTES
GYN progress note    HPI:  Bleeding has stopped.  She did not get her pacemaker today.    Vitals:    03/04/19 1530   BP: 120/89   Pulse: 82   Resp: 20   Temp: 98.6 °F (37 °C)   SpO2: 92%     Labs:  Pap negative for intraepithelial neoplasia, endocervical cells present    A/P:  Vaginal bleeding has stopped.  Pap negative.  Suspect this was from a thin endometrial lining. Discussed f/u as outpatient if bleeding starts again.     Please contact my office w/ any questions or concerns.  [phone]    Chinyere Loza MD  3/4/2019  5:18 PM

## 2019-03-04 NOTE — CONSULTS
Patient not a candidate for phase II Cardiac rehab, will give information regarding phase III.

## 2019-03-04 NOTE — PROGRESS NOTES
Name: Saba Bonner ADMIT: 2019   : 1945  PCP: Supa Otero MD    MRN: [id number] LOS: 4 days   AGE/SEX: 74 y.o. female  ROOM: /   Subjective   CC: vaginal bleeding  Plan for PPM today. Scant amount of blood noted on purwick. Chronic back pain with discomfort. No issues with iron supplements in the past.     Objective   Vital Signs  Temp:  [97.7 °F (36.5 °C)-98.3 °F (36.8 °C)] 97.7 °F (36.5 °C)  Heart Rate:  [80] 80  Resp:  [16] 16  BP: (112-162)/(73-90) 130/76  SpO2:  [95 %] 95 %  on   ;   Device (Oxygen Therapy): room air  Body mass index is 32.78 kg/m².    Physical Exam   Constitutional: No distress.   HENT:   Head: Normocephalic and atraumatic.   Mouth/Throat: Oropharynx is clear and moist.   Eyes: Conjunctivae and EOM are normal. Pupils are equal, round, and reactive to light.   Neck: Normal range of motion. Neck supple. No JVD present.   Cardiovascular: Normal rate, regular rhythm and intact distal pulses.   Paced rhythm on monitor. Temporary pacemaker on right chest secured with dressing   Pulmonary/Chest: Effort normal and breath sounds normal.   Abdominal: Soft. Bowel sounds are normal.   Musculoskeletal: She exhibits edema. She exhibits no tenderness.   Neurological: She is alert.   Skin: Skin is warm and dry. She is not diaphoretic.   Hyperpigmentation of BLE   Psychiatric: She has a normal mood and affect. Her behavior is normal.   Nursing note and vitals reviewed.      Results Review:       I reviewed the patient's new clinical results.  Results from last 7 days   Lab Units 19  0338 19  0357 19  0756 19  0104  19  0638   WBC 10*3/mm3 12.13* 11.65*  --  12.92*  --  10.74   HEMOGLOBIN g/dL 10.7* 10.0* 10.0* 9.6*   < > 9.2*   PLATELETS 10*3/mm3 267 302  --  305  --  295    < > = values in this interval not displayed.     Results from last 7 days   Lab Units 19  0338 19  0357 19  0104 19  0638   SODIUM mmol/L 139 140 140 140    POTASSIUM mmol/L 3.4* 3.5 3.7 4.3   CHLORIDE mmol/L 99 101 101 102   CO2 mmol/L 23.9 24.6 25.7 24.8   BUN mg/dL 22 23 21 17   CREATININE mg/dL 1.18* 1.20* 1.07* 1.16*   GLUCOSE mg/dL 122* 99 129* 149*   Estimated Creatinine Clearance: 42.6 mL/min (A) (by C-G formula based on SCr of 1.18 mg/dL (H)).  Results from last 7 days   Lab Units 02/28/19  1812   ALBUMIN g/dL 3.70   BILIRUBIN mg/dL 0.2   ALK PHOS U/L 95   AST (SGOT) U/L 11   ALT (SGPT) U/L 14     Results from last 7 days   Lab Units 03/04/19  0338 03/03/19  0357 03/02/19  0104 03/01/19  0638 02/28/19  1812   CALCIUM mg/dL 9.3 9.4 9.5 9.4 9.5   ALBUMIN g/dL  --   --   --   --  3.70           acetaminophen 650 mg Oral 4x Daily   aspirin 81 mg Oral Daily   atorvastatin 20 mg Oral Daily   doxycycline 50 mg Oral Q12H   DULoxetine 60 mg Oral BID   enoxaparin 40 mg Subcutaneous Q24H   furosemide 40 mg Oral Daily   isosorbide mononitrate 30 mg Oral Daily   levothyroxine 100 mcg Oral Daily   nystatin  Topical Q12H   pantoprazole 40 mg Oral QAM   potassium chloride 20 mEq Oral BID With Meals   potassium chloride 40 mEq Oral Once   predniSONE 10 mg Oral Daily   QUEtiapine 12.5 mg Oral BID   sodium chloride 3 mL Intravenous Q12H   vancomycin 1,000 mg Intravenous On Call      NPO Diet      Assessment/Plan      Active Hospital Problems    Diagnosis Date Noted   • **AV heart block [I44.30] 02/28/2019   • Third degree AV block (CMS/HCC) [I44.2] 03/01/2019   • Postmenopausal vaginal bleeding [N95.0] 02/28/2019   • Murmur, cardiac [R01.1] 02/28/2019   • CKD (chronic kidney disease), stage III (CMS/HCC) [N18.3] 12/18/2018   • RA (rheumatoid arthritis) (CMS/HCC) [M06.9] 12/18/2018   • Paraplegia (CMS/HCC) [G82.20] 12/18/2018   • History of pulmonary embolism [Z86.711] 12/18/2018   • Anemia [D64.9] 09/30/2016      Resolved Hospital Problems   No resolved problems to display.     Complete Heart Block  - temporary pacemaker placed 3/1/19, plans for Micra tomorrow  - cardiology  following, appreciate recs    Vaginal Bleeding  - normal vaginal US. No further bleeding. Awaiting path results from pap smear  - differentials include: cervical polyp, cervical dysplasia, endometrial atrophy, endometrial malignancy  - holding eliquis (hx of DVT) for now  - gynecology following, appreciate recs    Anemia  - hx of chronic iron deficiency with ABLA from vaginal bleeding. S/p 1 unit PRBC on 2/28/19  - low iron (21). Will add ferrous sulfate BID   - repeat iron levels in 6 weeks    Hypothyroidism  - TSH is low, reduced home dose of levothyroxine  - follow TSH in 4-6 weeks as outpatient    Hx of L TKA with chronic OM  - had seen ID at [place] and had been on suppressive doxycycline that was resumed at 50 mg BID  - appreciate ID input and cleared for PPM today.   - follow up with [facility] ID after disharge    DVT Prophylaxis  - on lovenox 40 daily   - on AC with Eliquis at home and may need to consider SCDs or prophylactic lovenox if this needs to be held for a prolonged period, especially given her hx of DVT, stroke and PE    PT to eval and CCP to help with HH (used [facility] in the past)    Disposition: likely back to facility with HH  - follow up labs TSH and iron studies in 4-6 weeks    SERGIO Lou  [facility]  03/04/19  9:34 AM     Addendum: I have reviewed the history and plan as obtained by SERGIO Lou and preformed my own independent history adjust documentation as needed. I have personally examined the patient. My exam confirms her physical findings and I agree with the plan as listed above, with the addition to the following:   I am not really sure why her pacemaker was canceled today.  This obviously has upset the patient.  I tried to explain to her there are lots of things that go into this.  I discussed with the nurse and she is not entirely sure the reasoning behind why it was canceled either.  Hopefully this can be done tomorrow.  Awaiting final  recommendations from OB/GYN.  I am unable to really see where her Pap smear results will come back when they are back.  I discussed this with the nurse.  Neel Goldberg MD  2:07 PM

## 2019-03-04 NOTE — PLAN OF CARE
Problem: Patient Care Overview  Goal: Plan of Care Review  Outcome: Ongoing (interventions implemented as appropriate)   03/04/19 8027   Coping/Psychosocial   Plan of Care Reviewed With patient   OTHER   Outcome Summary VSS, Pt to have pacemaker placed on tuesday. C/O back pain, medicated with Percocet. Will continue to monitor.

## 2019-03-05 NOTE — PROGRESS NOTES
Name: Saba Bonner ADMIT: 2019   : 1945  PCP: Supa Otero MD    MRN: [id number] LOS: 5 days   AGE/SEX: 74 y.o. female  ROOM:    Subjective   CC: vaginal bleeding  Plan for PPM today. No more vaginal bleeding. Chronic back pain with discomfort. No issues with iron supplements in the past. Was able to get to chair with PT yesterday    Objective   Vital Signs  Temp:  [97.6 °F (36.4 °C)-98.7 °F (37.1 °C)] 97.6 °F (36.4 °C)  Heart Rate:  [73-83] 80  Resp:  [16-20] 16  BP: (111-145)/(67-97) 130/67  SpO2:  [92 %-96 %] 94 %  on   ;   Device (Oxygen Therapy): room air  Body mass index is 32.78 kg/m².    Physical Exam   Constitutional: No distress.   HENT:   Head: Normocephalic and atraumatic.   Mouth/Throat: Oropharynx is clear and moist.   Eyes: Conjunctivae and EOM are normal. Pupils are equal, round, and reactive to light.   Neck: Normal range of motion. Neck supple. No JVD present.   Cardiovascular: Normal rate, regular rhythm and intact distal pulses.   Paced rhythm on monitor. Temporary pacemaker on right chest secured with dressing   Pulmonary/Chest: Effort normal and breath sounds normal.   Abdominal: Soft. Bowel sounds are normal.   Musculoskeletal: She exhibits edema. She exhibits no tenderness.   Neurological: She is alert.   Skin: Skin is warm and dry. She is not diaphoretic.   Hyperpigmentation of BLE   Psychiatric: She has a normal mood and affect. Her behavior is normal.   Nursing note and vitals reviewed.      Results Review:       I reviewed the patient's new clinical results.  Results from last 7 days   Lab Units 19  0509 19  0338 19  0357 19  0756 19  0104   WBC 10*3/mm3 12.36* 12.13* 11.65*  --  12.92*   HEMOGLOBIN g/dL 10.3* 10.7* 10.0* 10.0* 9.6*   PLATELETS 10*3/mm3 267 267 302  --  305     Results from last 7 days   Lab Units 19  0509 19  0338 19  0357 19  0104   SODIUM mmol/L 139 139 140 140   POTASSIUM mmol/L 3.6 3.4*  3.5 3.7   CHLORIDE mmol/L 102 99 101 101   CO2 mmol/L 23.4 23.9 24.6 25.7   BUN mg/dL 19 22 23 21   CREATININE mg/dL 1.11* 1.18* 1.20* 1.07*   GLUCOSE mg/dL 122* 122* 99 129*   Estimated Creatinine Clearance: 45.3 mL/min (A) (by C-G formula based on SCr of 1.11 mg/dL (H)).  Results from last 7 days   Lab Units 02/28/19  1812   ALBUMIN g/dL 3.70   BILIRUBIN mg/dL 0.2   ALK PHOS U/L 95   AST (SGOT) U/L 11   ALT (SGPT) U/L 14     Results from last 7 days   Lab Units 03/05/19  0509 03/04/19  0338 03/03/19  0357 03/02/19  0104  02/28/19  1812   CALCIUM mg/dL 9.4 9.3 9.4 9.5   < > 9.5   ALBUMIN g/dL  --   --   --   --   --  3.70    < > = values in this interval not displayed.           acetaminophen 650 mg Oral 4x Daily   aspirin 81 mg Oral Daily   atorvastatin 20 mg Oral Daily   doxycycline 50 mg Oral Q12H   DULoxetine 60 mg Oral BID   enoxaparin 40 mg Subcutaneous Q24H   ferrous sulfate 325 mg Oral BID With Meals   furosemide 40 mg Oral Daily   isosorbide mononitrate 30 mg Oral Daily   levothyroxine 100 mcg Oral Daily   nystatin  Topical Q12H   pantoprazole 40 mg Oral QAM   potassium chloride 20 mEq Oral BID With Meals   predniSONE 10 mg Oral Daily   QUEtiapine 12.5 mg Oral BID   sodium chloride 3 mL Intravenous Q12H   vancomycin 1,000 mg Intravenous On Call      NPO Diet      Assessment/Plan      Active Hospital Problems    Diagnosis  POA   • **AV heart block [I44.30]  Unknown   • Third degree AV block (CMS/HCC) [I44.2]  No   • Postmenopausal vaginal bleeding [N95.0]  Yes   • Murmur, cardiac [R01.1]  Yes   • CKD (chronic kidney disease), stage III (CMS/HCC) [N18.3]  Yes   • RA (rheumatoid arthritis) (CMS/HCC) [M06.9]  Yes   • Paraplegia (CMS/HCC) [G82.20]  Yes   • History of pulmonary embolism [Z86.711]  Yes   • Anemia [D64.9]  Yes      Resolved Hospital Problems   No resolved problems to display.     Complete Heart Block  - temporary pacemaker placed 3/1/19, still AVB underlining rhythm. plans for Micra today  -  cardiology following, appreciate recs    Vaginal Bleeding  - normal vaginal US. No further bleeding. Pap negative for dysplasia. Likely from thin endometrial lining. No further bleeding.   - follow up with gynecology outpatient    Anemia  - hx of chronic iron deficiency with ABLA from vaginal bleeding. S/p 1 unit PRBC on 2/28/19. - low iron (21) and ferritin.   - continue ferrous sulfate BID   - repeat iron levels in 6 weeks    Hypothyroidism  - TSH was low, reduced home dose of levothyroxine  - follow TSH in 4-6 weeks as outpatient    Hx of L TKA with chronic OM  - had seen ID at [place] and had been on suppressive doxycycline that was resumed at 50 mg BID  - appreciate ID input and cleared for PPM today.   - follow up with [facility] ID after disharge    DVT Prophylaxis  - on lovenox 40 daily   - on AC with Eliquis at home and will transition back to Eliquis when ok with Dr. Huitron.     Continue with PT    Disposition: likely back to [facility] assisted living with   - follow up labs TSH and iron studies in 4-6 weeks  F/u with gynecology    SERGIO Lou  [facility]  03/05/19  11:14 AM     Addendum: I have reviewed the history and plan as obtained by SERGIO Lou and preformed my own independent history adjust documentation as needed. I have personally examined the patient. My exam confirms her physical findings and I agree with the plan as listed above, with the addition to the following:   Unfortunately during her procedure she had some complications.  Following the pacemaker placement she developed benign.  A drain was placed and she did regain a rhythm at that time.  She was transferred to the CCU and ultimately developed PEA arrest.  At that point resuscitation was stopped after discussions with her son.  Time of death 1558.  Family is requesting an autopsy but this might be difficult to obtain given the fact that [place]..    Neel Goldberg MD  5:01  PM

## 2019-03-05 NOTE — ANESTHESIA PROCEDURE NOTES
Airway  Urgency: emergent    Airway not difficult    General Information and Staff    Patient location during procedure: OR (Cath Lab)  Anesthesiologist: Kyaw Arango MD    Consent for Airway (if performed for an anesthetic, see related documentation for consents)  Consent: The procedure was performed in an emergent situation. Verbal consent not obtained. Written consent not obtained.  Risks and benefits: risks, benefits and alternatives were not discussed      Indications and Patient Condition  Indications for airway management: airway protection    Preoxygenated: yes  Mask difficulty assessment: 1 - vent by mask    Final Airway Details  Final airway type: endotracheal airway      Successful airway: ETT  Cuffed: yes   Successful intubation technique: direct laryngoscopy  Facilitating devices/methods: anterior pressure/BURP  Endotracheal tube insertion site: oral  Blade: Fabiano  Blade size: 3  ETT size (mm): 8.0  Cormack-Lehane Classification: grade I - full view of glottis  Placement verified by: chest auscultation and capnometry   Measured from: lips  ETT to lips (cm): 22  Number of attempts at approach: 1

## 2019-03-05 NOTE — ANESTHESIA POSTPROCEDURE EVALUATION
Patient: Saba Bonner    Procedure Summary     Date:  03/05/19 Room / Location:  [facility] CATH/EP LAB  /  [facility] INVASIVE LOCATION    Anesthesia Start:  1530 Anesthesia Stop:  1537    Procedure:  Pacemaker SC new-Micra [employer]  removal of RIJ temp pacer (N/A ) Diagnosis:       Third degree AV block (CMS/HCC)      (AV block)    Provider:  Shailesh Huitron MD Provider:  Kyaw Arango MD    Anesthesia Type:  Not recorded ASA Status:  5 - Emergent          Anesthesia Type: No value filed.  Last vitals  BP   130/67 (03/05/19 1104)   Temp   36.4 °C (97.6 °F) (03/05/19 1104)   Pulse   80 (03/05/19 1104)   Resp   16 (03/05/19 1356)     SpO2   97 % (03/05/19 1331)     Post Anesthesia Care and Evaluation    Patient location during evaluation: ICU  Anesthetic complications: No anesthetic complications

## 2019-03-05 NOTE — PROGRESS NOTES
While I was doing my note in the CCU, was called because of PEA activity.  All of a sudden she lost her blood pressure and pulse.  She then widened out her QRS and of course she had a pacemaker so she continued to pace.  This was a sudden event.    I had previously discussed with the son not to further resuscitate in the event of PA or ventricular fibrillation.  We then pronounced her dead at this time.

## 2019-03-05 NOTE — ANESTHESIA PREPROCEDURE EVALUATION
Anesthesia Evaluation         Anesthesia Plan    ASA 5 - emergent   (Emergency intubation during Code Blue)

## 2019-03-05 NOTE — CONSULTS
Patient Care Team:  Supa Otero MD as PCP - General (Internal Medicine)  Dane Farrell PA as PCP - Claims Attributed  Tico Brush MD as Surgeon (Neurosurgery)  Jonah Hernadez MD as Consulting Physician (Rheumatology)  Asael Crawford MD (Infectious Diseases)      Subjective     Patient is a 74 y.o. female.  He is a retired physician with multiple medical problems who had complete AV heart block has had a temporary pacemaker and block did not resolve so she was going for a micro today.  The lead perforated the RV and patient had tamponade and arrest she had a fairly brief resuscitation intubated and on a ventilator on vasopressors seeing her in the Cath Lab she is not responsive at this time.  I have discussed with Dr. Huitron, Dr. Arango and have reviewed the chart.  I am asked to see and assist with critical care management postoperatively.  She is very T and tired pediatric gastroenterologist she has a history of rheumatoid arthritis she had apparently an infected prosthetic knee had the prosthesis in the removed and the fused.  She has been on chronic suppressive antibiotic therapy for about a year now.  She has a history of coronary disease and stent to the RCA chronic left bundle branch block she had a prior CVA DVT and PE and she has been on apixaban for that she has hypertension hyperlipidemia.  She is on chronic steroid therapy.  Patient presented with increasing shortness of breath over the last month and episodes of dizziness  Patient has had an echocardiogram apparently the bleeding seems to have stopped in the pericardium fairly well evacuated she is on epinephrine and norepinephrine drips    Review of Systems:  Unable to obtain      History  Past Medical History:   Diagnosis Date   • Coronary artery disease    • Deep vein thrombosis (CMS/HCC)    • Depression    • Hiatal hernia    • Hyperlipidemia    • Hypertension    • Hypothyroid    • Low back pain    •  Myocardial infarction (CMS/[facility]) 2013    PT DENIES, ONLY SYMPTOM SHORTNESS OF BREATH   • Osteoarthritis    • Osteomyelitis of knee region (CMS/[facility])     left   • Pulmonary disease    • Pulmonary embolism (CMS/[facility]) 2016    bilateral   • RA (rheumatoid arthritis) (CMS/[facility])    • Stroke (CMS/[facility])      Past Surgical History:   Procedure Laterality Date   • APPENDECTOMY     • CARDIAC CATHETERIZATION N/A 10/4/2016    Procedure: Left Heart Cath;  Surgeon: Kyaw Mcneil MD;  Location: [facility] CATH INVASIVE LOCATION;  Service:    • CARDIAC ELECTROPHYSIOLOGY PROCEDURE N/A 3/1/2019    Procedure: Temporary Pacemaker- Pacing lead and external pacemaker generator;  Surgeon: Shailesh Huitron MD;  Location:  [facility] CATH INVASIVE LOCATION;  Service: Cardiology   •  SECTION     • KNEE SURGERY     • KYPHOPLASTY  2014    L3 kyphoplasty- Dr. Brush   • LUMBAR FUSION     • LUMBAR LAMINECTOMY DISCECTOMY DECOMPRESSION       Social History     Socioeconomic History   • Marital status:      Spouse name: Not on file   • Number of children: 2   • Years of education: MD   • Highest education level: Not on file   Occupational History   • Occupation: RETIRED   Tobacco Use   • Smoking status: Never Smoker   • Smokeless tobacco: Never Used   Substance and Sexual Activity   • Alcohol use: Yes     Alcohol/week: 3.0 oz     Types: 5 Glasses of wine per week     Comment: Social use   • Drug use: No   • Sexual activity: Defer   Social History Narrative    INDEPENDENT LIVING     Family History   Problem Relation Age of Onset   • Other Other         CABG   • Heart disease Mother    • Hypertension Mother    • Heart attack Mother    • Heart disease Father    • Hypertension Father    • Heart attack Father    • No Known Problems Brother    • Colon cancer Maternal Grandmother    • No Known Problems Maternal Grandfather    • No Known Problems Paternal Grandmother    • No Known Problems Paternal Grandfather           Allergies:  Clindamycin/lincomycin    Medications:  Prior to Admission medications    Medication Sig Start Date End Date Taking? Authorizing Provider   ALPRAZolam (XANAX) 0.5 MG tablet Take 1 tablet by mouth 2 (Two) Times a Day As Needed for Anxiety. 1/14/19  Yes Supa Otero MD   amLODIPine (NORVASC) 10 MG tablet Take 1 tablet by mouth Daily. 1/31/19  Yes Supa Otero MD   apixaban (ELIQUIS) 5 MG tablet tablet Take 5 mg by mouth 2 (Two) Times a Day.   Yes Sergei Harmon MD   aspirin 81 MG chewable tablet Chew 81 mg Daily.   Yes Sergei Harmon MD   atorvastatin (LIPITOR) 20 MG tablet Take 20 mg by mouth Daily.   Yes Sergei Harmon MD   carvedilol (COREG) 12.5 MG tablet Take 12.5 mg by mouth 2 (Two) Times a Day With Meals.   Yes Sergei Harmon MD   DULoxetine (CYMBALTA) 60 MG capsule Take 60 mg by mouth 2 (Two) Times a Day.   Yes Sergei Harmon MD   furosemide (LASIX) 40 MG tablet Take 40 mg by mouth Daily.   Yes ProviderSergei MD   isosorbide mononitrate (IMDUR) 30 MG 24 hr tablet Take 1 tablet by mouth Daily. 1/30/19  Yes Supa Otero MD   levothyroxine (SYNTHROID, LEVOTHROID) 125 MCG tablet Take 125 mcg by mouth Daily.   Yes ProviderSergei MD   omeprazole (PriLOSEC) 20 MG capsule Take 20 mg by mouth daily. 10/7/12  Yes Sergei Harmon MD   oxyCODONE-acetaminophen (PERCOCET)  MG per tablet Take 1 tablet by mouth Every 4 (Four) Hours As Needed for Moderate Pain . 1/14/19  Yes Supa Otero MD   potassium chloride (KLOR-CON) 20 MEQ CR tablet Take 20 mEq by mouth 2 (Two) Times a Day.   Yes Sergei Harmon MD   predniSONE (DELTASONE) 10 MG tablet Take 10 mg by mouth Daily.   Yes Sergei Harmon MD   QUEtiapine (SEROquel) 25 MG tablet Take 12.5 mg by mouth 2 (Two) Times a Day.   Yes Sergei Harmon MD   traZODone (DESYREL) 50 MG tablet Take 50 mg by mouth At Night As Needed for Sleep.   Yes Sergei Harmon MD  "      [MAR Hold] acetaminophen 650 mg Oral 4x Daily   [MAR Hold] aspirin 81 mg Oral Daily   [MAR Hold] atorvastatin 20 mg Oral Daily   [MAR Hold] doxycycline 50 mg Oral Q12H   [MAR Hold] DULoxetine 60 mg Oral BID   [MAR Hold] enoxaparin 40 mg Subcutaneous Q24H   [MAR Hold] ferrous sulfate 325 mg Oral BID With Meals   [MAR Hold] furosemide 40 mg Oral Daily   [MAR Hold] isosorbide mononitrate 30 mg Oral Daily   [MAR Hold] levothyroxine 100 mcg Oral Daily   [MAR Hold] nystatin  Topical Q12H   [MAR Hold] pantoprazole 40 mg Oral QAM   potassium chloride 20 mEq Oral BID With Meals   [MAR Hold] predniSONE 10 mg Oral Daily   [MAR Hold] QUEtiapine 12.5 mg Oral BID   [MAR Hold] sodium chloride 3 mL Intravenous Q12H   [MAR Hold] vancomycin 1,000 mg Intravenous On Call       EPINEPHrine 0.02 mcg/kg/min    norepinephrine  Last Rate: 0.13 mcg/kg/min (03/05/19 1443)   sodium chloride  Last Rate: 50 mL/hr (03/05/19 1324)   vancomycin  Last Rate: Stopped (03/05/19 1425)       Objective     Vital Signs  Vital Sign Min/Max for last 24 hours  Temp  Min: 97.6 °F (36.4 °C)  Max: 98.6 °F (37 °C)   BP  Min: 120/89  Max: 145/97   Pulse  Min: 73  Max: 82   Resp  Min: 16  Max: 20   SpO2  Min: 92 %  Max: 97 %   Flow (L/min)  Min: 3  Max: 3   No Data Recorded       Intake/Output Summary (Last 24 hours) at 3/5/2019 1514  Last data filed at 3/5/2019 1323  Gross per 24 hour   Intake 360 ml   Output 1150 ml   Net -790 ml     I/O this shift:  In: -   Out: 550 [Urine:550]  Last Weight and Admission Weight        02/28/19  1741   Weight: 81.3 kg (179 lb 3.2 oz)     Flowsheet Rows      First Filed Value   Admission Height  157.5 cm (62\") Documented at 02/28/2019 1201   Admission Weight  83.9 kg (185 lb) Documented at 02/28/2019 1201          Body mass index is 32.78 kg/m².           Physical Exam:  General Appearance: Elderly obese white female lying on the cath room table.  She is intubated she is currently unresponsive she has gotten several " milligrams of Versed.  Eyes: Pupils are about 2-1/2 mm there are equal they do not react to light conjunctiva are clear  ENT: She has an 8.0 endotracheal tube in place  Gets about 24 cm at the lips  Neck: Trachea midline  Lungs: Breath sounds are fairly clear bilaterally  Cardiac: She has a pericardial drain in place and is draining a small amount of bloody fluid.  Tachycardic regular.  She has a pacemaker type device lying over her left chest  Abdomen: Obese soft nontender no palpable organomegaly  : She has a lot of excoriation in the chi-area   Musculoskeletal: She has chronic changes in both lower extremities multiple scars in the right lower extremity healed  Skin: Pale, really do not appreciate any cyanosis or mottling  Neuro: Patient is currently unresponsive but she has had some medication we will need to give her some time to see if she starts responding.  Extremities/P Vascular: She has a right femoral A-line and a left femoral introducer sheath in place which the vasopressors are infusing.  MSE: Unable to obtain      Labs:  Results from last 7 days   Lab Units 03/05/19  0509 03/04/19  0338 03/03/19  0357 03/02/19  0104 03/01/19  0638 02/28/19  1812   GLUCOSE mg/dL 122* 122* 99 129* 149* 222*   SODIUM mmol/L 139 139 140 140 140 142   POTASSIUM mmol/L 3.6 3.4* 3.5 3.7 4.3 3.2*   CO2 mmol/L 23.4 23.9 24.6 25.7 24.8 26.4   CHLORIDE mmol/L 102 99 101 101 102 101   ANION GAP mmol/L 13.6 16.1 14.4 13.3 13.2 14.6   CREATININE mg/dL 1.11* 1.18* 1.20* 1.07* 1.16* 1.38*   BUN mg/dL 19 22 23 21 17 17   BUN / CREAT RATIO  17.1 18.6 19.2 19.6 14.7 12.3   CALCIUM mg/dL 9.4 9.3 9.4 9.5 9.4 9.5   EGFR IF NONAFRICN AM mL/min/1.73 48* 45* 44* 50* 46* 38*   ALK PHOS U/L  --   --   --   --   --  95   TOTAL PROTEIN g/dL  --   --   --   --   --  6.0   ALT (SGPT) U/L  --   --   --   --   --  14   AST (SGOT) U/L  --   --   --   --   --  11   BILIRUBIN mg/dL  --   --   --   --   --  0.2   ALBUMIN g/dL  --   --   --   --   --   3.70   GLOBULIN gm/dL  --   --   --   --   --  2.3     Estimated Creatinine Clearance: 45.3 mL/min (A) (by C-G formula based on SCr of 1.11 mg/dL (H)).      Results from last 7 days   Lab Units 03/05/19  0509 03/04/19  0338 03/03/19  0357 03/02/19  0756 03/02/19  0104 03/01/19  1754 03/01/19  0638 02/28/19  1216   WBC 10*3/mm3 12.36* 12.13* 11.65*  --  12.92*  --  10.74 11.06*   RBC 10*6/mm3 3.87 3.96 3.80  --  3.61*  --  3.43* 3.10*   HEMOGLOBIN g/dL 10.3* 10.7* 10.0* 10.0* 9.6* 10.2* 9.2* 8.1*   HEMATOCRIT % 33.9* 36.3 34.2 34.4 32.5* 34.5 31.5* 28.3*   MCV fL 87.6 91.7 90.0  --  90.0  --  91.8 91.3   MCH pg 26.6 27.0 26.3*  --  26.6  --  26.8 26.1*   MCHC g/dL 30.4* 29.5* 29.2*  --  29.5*  --  29.2* 28.6*   RDW % 15.2 15.0 15.3  --  15.2  --  15.3 15.5*   RDW-SD fl 48.4 50.2 49.9  --  50.2  --  51.0 51.2   MPV fL 10.5 10.3 10.3  --  10.0  --  9.8 9.9   PLATELETS 10*3/mm3 267 267 302  --  305  --  295 315   NEUTROPHIL % % 69.5 61.5 65.8  --  66.4  --  72.9 74.1   LYMPHOCYTE % % 21.0 25.3 22.2  --  21.4  --  19.2* 15.0*   MONOCYTES % % 6.6 9.3 8.3  --  10.0  --  6.0 7.9   EOSINOPHIL % % 1.1 2.2 2.0  --  0.8  --  0.5 1.6   BASOPHIL % % 0.5 0.5 0.8  --  0.3  --  0.5 0.5   IMM GRAN % % 1.3* 1.2* 0.9*  --  1.1*  --  0.9* 0.9*   NEUTROS ABS 10*3/mm3 8.59* 7.45* 7.66*  --  8.58*  --  7.84* 8.19*   LYMPHS ABS 10*3/mm3 2.59 3.07 2.59  --  2.77  --  2.06 1.66   MONOS ABS 10*3/mm3 0.82 1.13* 0.97*  --  1.29*  --  0.64 0.87   EOS ABS 10*3/mm3 0.14 0.27 0.23  --  0.10  --  0.05 0.18   BASOS ABS 10*3/mm3 0.06 0.06 0.09  --  0.04  --  0.05 0.06   IMMATURE GRANS (ABS) 10*3/mm3 0.16* 0.15* 0.11*  --  0.14*  --  0.10* 0.10*   NRBC /100 WBC 0.1* 0.1* 0.1*  --  0.1*  --  0.0 0.0         Results from last 7 days   Lab Units 03/01/19  0638 02/28/19  1812   TROPONIN T ng/mL 0.020 0.020     Results from last 7 days   Lab Units 03/01/19  0638   PROBNP pg/mL 1,222.0*     Results from last 7 days   Lab Units 02/28/19  1812   TSH mIU/mL  0.181*   FREE T4 ng/dL 1.61         Results from last 7 days   Lab Units 02/28/19  1216   INR  1.39*     Microbiology Results (last 10 days)     ** No results found for the last 240 hours. **            Diagnostics:  Us Pelvis Complete    Result Date: 3/1/2019  COMPLETE PELVIC SONOGRAM  HISTORY: 74-year-old female with a history of vaginal bleeding and passing clots.  FINDINGS: Transabdominal and transvaginal pelvic sonography was performed. The examination is limited due to patient's body habitus and inability to get into the    position for the examination.  The uterus measures 6.3 x 1.7 x 2.9 cm and is anteflexed. The endometrial bilayer measures 0.3 cm in thickness. No abnormality of the uterus is appreciated.  Neither ovary is visualized. No free fluid is seen within the pelvic cul-de-sac.      Limited pelvic sonogram. However, the uterus has a normal appearance. Neither ovary is visualized. No abnormality of the pelvis is appreciated.  This report was finalized on 3/1/2019 4:11 PM by Dr. Orlando Martinez M.D.      Results for orders placed during the hospital encounter of 02/28/19   Adult Transthoracic Echo Complete W/ Cont if Necessary Per Protocol    Narrative · Left ventricular systolic function is normal.  · Left ventricular wall thickness is consistent with mild concentric   hypertrophy.  · Left ventricular diastolic dysfunction (grade I) consistent with   impaired relaxation.  · Severe MAC is present.  · Mild mitral valve regurgitation is present  · Mild tricuspid valve regurgitation is present.  · Estimated right ventricular systolic pressure from tricuspid   regurgitation is mildly elevated (35-45 mmHg).  · Calculated right ventricular systolic pressure from tricuspid   regurgitation is 37 mmHg.  · Intermittant heart block making EF calculation difficult              Assessment/Plan     1. Patient is status post resuscitated cardiac arrest with multiple problems as noted above.  Patient arrested again  pulseless electrical activity were unable to aspirate anything from her pericardial drain.  Dr. Huitron is at bedside.  He had spoken to the patient's son the patient was not to have CPR in the event of a repeat arrest.  Patient ha no pulse confirmed by Dr. Huitron myself and indwelling arterial line  was flat nursing also confirmed patient was pronounced      Liam Rios MD  03/05/19  3:14 PM    Time: I spent about an hour 20 min critical care time with the patient

## 2019-03-05 NOTE — NURSING NOTE
Pt arrived on floor, cath lab team gave bedside report. Flor Huitron and Gabriel at bedside within 5-7 min. Discussed plan of care, verbal order for colindres per matt. Colindres was placed by myself and Kristi SALAS RN. Miracle PERRY, was re-dressing her arterial line dressing in her groin, when I noted her sats were dropping but pleth was poor. I re-zero'd and flushed the art line and saw that it was still profoundly flat. I called out of the room for Dr. Huitron and Dr. Rios. At bedside Dr. Huitron checked to see that pericardial drain was working, pulse check revealed no pulse, no auscultated heart tones heard. WILLIE called at 1558.

## 2019-03-05 NOTE — PLAN OF CARE
Problem: Fall Risk (Adult)  Goal: Absence of Fall  Outcome: Ongoing (interventions implemented as appropriate)      Problem: Patient Care Overview  Goal: Plan of Care Review  Outcome: Ongoing (interventions implemented as appropriate)   03/05/19 0543   Plan of Care Review   Progress no change   OTHER   Outcome Summary Pt c/o back pain relieved with prn pain medication and repositioning, she is NPO for pacemaker this afternoon, denies any other pain or discomfort, VSS     Goal: Discharge Needs Assessment  Outcome: Ongoing (interventions implemented as appropriate)      Problem: Skin Injury Risk (Adult)  Goal: Skin Health and Integrity  Outcome: Ongoing (interventions implemented as appropriate)

## 2019-03-06 LAB
HCT VFR BLDA CALC: 28 % (ref 38–51)
HGB BLDA-MCNC: 9.5 G/DL (ref 12–17)
SAO2 % BLDA: 98 % (ref 95–98)

## 2019-03-06 NOTE — DISCHARGE SUMMARY
This is a death summary.  The patient was admitted originally with vaginal bleeding but was found to be in AV block and that is how we got involved.  The AV block did not resolve.  We then put a temporary pacemaker in.  We got infectious disease clearance because I was concerned about putting a permanent pacemaker and because of her chronic osteomyelitis and infection history.  She did okay over the weekend and then on Tuesday when she had her attempted pacemaker she had a pericardial tamponade and cardiac arrest from the pericardial tamponade not and despite our resuscitation efforts as documented in the operative note and post procedure note she had PEA activity in the CCU.

## 2019-03-14 LAB
CYTO UR: NORMAL
LAB AP CASE REPORT: NORMAL
LAB AP CLINICAL INFORMATION: NORMAL
LAB AP PROVISIONAL DIAGNOSIS: NORMAL
PATH REPORT.FINAL DX SPEC: NORMAL
PATH REPORT.GROSS SPEC: NORMAL

## (undated) DEVICE — SHEATH INTRO MICRA HC 23F 55.7CM

## (undated) DEVICE — INTRO HEMOS FASTCATH CATHLK 6F 12CM

## (undated) DEVICE — KT PERICARDIOCENTESIS PIG .035IN 6F

## (undated) DEVICE — SYS PACE TRANSCATH MICRA FLEXFIX SR 25.9MM
Type: IMPLANTABLE DEVICE | Status: NON-FUNCTIONAL
Removed: 2019-03-05

## (undated) DEVICE — Device

## (undated) DEVICE — DIL VESL 14F.038 20CM

## (undated) DEVICE — PK CATH CARD 40

## (undated) DEVICE — DIL VESL 16F .038 20CM

## (undated) DEVICE — DIL VESL 12F.038 20CM

## (undated) DEVICE — GW SUP AMPLATZ ULTR/STFF/STR PTFE SS 0.35IN 145CM

## (undated) DEVICE — DIL VESL STD .038 10F 20CM

## (undated) DEVICE — RADIFOCUS GLIDEWIRE: Brand: GLIDEWIRE

## (undated) DEVICE — INTRO TEAR AWAY/LVD W/SD PRT 8F 13CM

## (undated) DEVICE — INTRO SHEATH PRELUDE SNAP .038 7F 25CM W/SDPRT ORNG

## (undated) DEVICE — PINNACLE INTRODUCER SHEATH: Brand: PINNACLE

## (undated) DEVICE — LOU PACE DEFIB: Brand: MEDLINE INDUSTRIES, INC.